# Patient Record
Sex: FEMALE | Race: WHITE | HISPANIC OR LATINO | Employment: FULL TIME | URBAN - METROPOLITAN AREA
[De-identification: names, ages, dates, MRNs, and addresses within clinical notes are randomized per-mention and may not be internally consistent; named-entity substitution may affect disease eponyms.]

---

## 2022-04-14 ENCOUNTER — TELEPHONE (OUTPATIENT)
Dept: SCHEDULING | Facility: IMAGING CENTER | Age: 27
End: 2022-04-14

## 2022-04-25 ENCOUNTER — OFFICE VISIT (OUTPATIENT)
Dept: MEDICAL GROUP | Facility: PHYSICIAN GROUP | Age: 27
End: 2022-04-25
Payer: COMMERCIAL

## 2022-04-25 VITALS
BODY MASS INDEX: 26.79 KG/M2 | TEMPERATURE: 98.1 F | RESPIRATION RATE: 12 BRPM | DIASTOLIC BLOOD PRESSURE: 50 MMHG | WEIGHT: 160.8 LBS | OXYGEN SATURATION: 97 % | SYSTOLIC BLOOD PRESSURE: 106 MMHG | HEART RATE: 86 BPM | HEIGHT: 65 IN

## 2022-04-25 DIAGNOSIS — F41.1 GENERALIZED ANXIETY DISORDER: ICD-10-CM

## 2022-04-25 DIAGNOSIS — Z23 NEED FOR VACCINATION: ICD-10-CM

## 2022-04-25 DIAGNOSIS — Z00.00 WELLNESS EXAMINATION: ICD-10-CM

## 2022-04-25 PROCEDURE — 90651 9VHPV VACCINE 2/3 DOSE IM: CPT

## 2022-04-25 PROCEDURE — 99203 OFFICE O/P NEW LOW 30 MIN: CPT | Mod: 25

## 2022-04-25 PROCEDURE — 90471 IMMUNIZATION ADMIN: CPT

## 2022-04-25 RX ORDER — LEVONORGESTREL AND ETHINYL ESTRADIOL 0.1-0.02MG
1 KIT ORAL DAILY
COMMUNITY
Start: 2021-10-29 | End: 2022-04-25

## 2022-04-25 RX ORDER — TIMOLOL MALEATE 5 MG/ML
1 SOLUTION/ DROPS OPHTHALMIC DAILY
COMMUNITY
Start: 2022-04-11 | End: 2022-06-27 | Stop reason: SDUPTHER

## 2022-04-25 ASSESSMENT — ANXIETY QUESTIONNAIRES
3. WORRYING TOO MUCH ABOUT DIFFERENT THINGS: NEARLY EVERY DAY
GAD7 TOTAL SCORE: 11
IF YOU CHECKED OFF ANY PROBLEMS ON THIS QUESTIONNAIRE, HOW DIFFICULT HAVE THESE PROBLEMS MADE IT FOR YOU TO DO YOUR WORK, TAKE CARE OF THINGS AT HOME, OR GET ALONG WITH OTHER PEOPLE: SOMEWHAT DIFFICULT
1. FEELING NERVOUS, ANXIOUS, OR ON EDGE: MORE THAN HALF THE DAYS
6. BECOMING EASILY ANNOYED OR IRRITABLE: SEVERAL DAYS
5. BEING SO RESTLESS THAT IT IS HARD TO SIT STILL: MORE THAN HALF THE DAYS
7. FEELING AFRAID AS IF SOMETHING AWFUL MIGHT HAPPEN: SEVERAL DAYS
4. TROUBLE RELAXING: SEVERAL DAYS
2. NOT BEING ABLE TO STOP OR CONTROL WORRYING: SEVERAL DAYS

## 2022-04-25 ASSESSMENT — PATIENT HEALTH QUESTIONNAIRE - PHQ9: CLINICAL INTERPRETATION OF PHQ2 SCORE: 0

## 2022-04-25 ASSESSMENT — ENCOUNTER SYMPTOMS: NERVOUS/ANXIOUS: 1

## 2022-04-25 NOTE — ASSESSMENT & PLAN NOTE
- Healthy 26-year-old female no concerns today  - Recommend eating a well-balanced diet rich in fruits and vegetables, lean proteins including fish at least twice a week, limited portions of carbohydrates or highly processed foods/sweets  -Recommend daily exercise of at least 30 minutes of moderate to vigorous activity most days the week  -Recommend self-care including meditation and continuation of fostering high-quality coping mechanisms

## 2022-04-25 NOTE — ASSESSMENT & PLAN NOTE
- Chronic stable condition  - Continue to work on self-care including self-esteem, avoiding triggers, and setting boundaries  - Recommend reordering CBT and medications if needed-please make follow-up if condition worsens

## 2022-04-25 NOTE — PROGRESS NOTES
"Subjective:     CC:  Diagnoses of Generalized anxiety disorder, Need for vaccination, and Wellness examination were pertinent to this visit.    HISTORY OF THE PRESENT ILLNESS: Patient is a 26 y.o. female. This pleasant patient is here today to establish care and discuss the following problems:    Problem   Generalized Anxiety Disorder    Chronic condition for 8 years.    -ULISES-7 = 11  -Ongoing work on self esteem, learning triggers, setting boundaries  -CBT done in college, helpful  -Has taken RX in the past, Vivance low dose. Medication was helpful.     Wellness Examination    -Admits to poor diet at times.  Carb rich.  -Exercise: not on a regular routine, but active -hikes with dog, musical theatre.  -Good coping mechanism, good support system at home.           Health Maintenance: HPV given today, patient will bring in current vaccination record as she believes she had a Tdap recently.    ROS:   Review of Systems   Psychiatric/Behavioral: The patient is nervous/anxious (history of anxiety).    All other systems reviewed and are negative.        Objective:     Exam: /50 (BP Location: Left arm, Patient Position: Sitting, BP Cuff Size: Large adult)   Pulse 86   Temp 36.7 °C (98.1 °F) (Temporal)   Resp 12   Ht 1.641 m (5' 4.6\")   Wt 72.9 kg (160 lb 12.8 oz)   SpO2 97%  Body mass index is 27.09 kg/m².    Physical Exam      Labs: No current labs    Assessment & Plan: Medical Decision Making   26 y.o. female with the following -    Problem List Items Addressed This Visit     Generalized anxiety disorder     - Chronic stable condition  - Continue to work on self-care including self-esteem, avoiding triggers, and setting boundaries  - Recommend reordering CBT and medications if needed-please make follow-up if condition worsens         Wellness examination     - Healthy 26-year-old female no concerns today  - Recommend eating a well-balanced diet rich in fruits and vegetables, lean proteins including fish at " least twice a week, limited portions of carbohydrates or highly processed foods/sweets  -Recommend daily exercise of at least 30 minutes of moderate to vigorous activity most days the week  -Recommend self-care including meditation and continuation of fostering high-quality coping mechanisms           Other Visit Diagnoses     Need for vaccination        Relevant Orders    9VHPV Vaccine 2-3 Dose (GARDASIL 9)          Differential diagnosis, natural history, supportive care, and indications for immediate follow-up discussed.  Shared decision making approach was utilized, and patient is amendable with plan of care.  Patient understands to return to clinic or go to the emergency department if symptoms worsen. All questions and concerns addressed.      Return in about 6 months (around 10/25/2022) for Dose 2 HPV, anxiety.    Please note that this dictation was created using voice recognition software. I have made every reasonable attempt to correct obvious errors, but I expect that there are errors of grammar and possibly content that I did not discover before finalizing the note.

## 2022-05-29 ENCOUNTER — OFFICE VISIT (OUTPATIENT)
Dept: URGENT CARE | Facility: CLINIC | Age: 27
End: 2022-05-29
Payer: COMMERCIAL

## 2022-05-29 VITALS
HEIGHT: 67 IN | RESPIRATION RATE: 20 BRPM | OXYGEN SATURATION: 97 % | DIASTOLIC BLOOD PRESSURE: 68 MMHG | TEMPERATURE: 98.8 F | SYSTOLIC BLOOD PRESSURE: 104 MMHG | HEART RATE: 98 BPM | WEIGHT: 145 LBS | BODY MASS INDEX: 22.76 KG/M2

## 2022-05-29 DIAGNOSIS — J01.90 ACUTE BACTERIAL SINUSITIS: ICD-10-CM

## 2022-05-29 DIAGNOSIS — B96.89 ACUTE BACTERIAL SINUSITIS: ICD-10-CM

## 2022-05-29 PROCEDURE — 99213 OFFICE O/P EST LOW 20 MIN: CPT | Performed by: FAMILY MEDICINE

## 2022-05-29 RX ORDER — AMOXICILLIN AND CLAVULANATE POTASSIUM 875; 125 MG/1; MG/1
1 TABLET, FILM COATED ORAL 2 TIMES DAILY
Qty: 20 TABLET | Refills: 0 | Status: SHIPPED | OUTPATIENT
Start: 2022-05-29 | End: 2022-06-08

## 2022-05-29 ASSESSMENT — ENCOUNTER SYMPTOMS
SINUS PAIN: 1
FEVER: 0

## 2022-05-29 NOTE — PROGRESS NOTES
"Subjective:     Farzana Henson is a 26 y.o. female who presents for Sinus Problem (Sinus pressure,congestion,x1 week)    HPI  Pt presents for evaluation of an acute problem  Patient with an acute illness for the past week  Started with sore throat   Progressed to cough and sinus pain   Having constant pain in the sinus area   Pain is worsening each day   Pain is moderate     Review of Systems   Constitutional: Negative for fever.   HENT: Positive for congestion and sinus pain.    Skin: Negative for rash.       PMH:  has a past medical history of Anxiety.  MEDS:   Current Outpatient Medications:   •  VIENVA 0.1-20 MG-MCG per tablet, Take 1 Tablet by mouth every day., Disp: , Rfl:   ALLERGIES: No Known Allergies  SURGHX:   Past Surgical History:   Procedure Laterality Date   • NASAL FRACTURE REDUCTION CLOSED       SOCHX:  reports that she has never smoked. She has never used smokeless tobacco. She reports current alcohol use. She reports that she does not use drugs.     Objective:   /68 (BP Location: Left arm, Patient Position: Sitting, BP Cuff Size: Adult)   Pulse 98   Temp 37.1 °C (98.8 °F) (Temporal)   Resp 20   Ht 1.702 m (5' 7\")   Wt 65.8 kg (145 lb)   SpO2 97%   BMI 22.71 kg/m²     Physical Exam  Constitutional:       General: She is not in acute distress.     Appearance: She is well-developed. She is not diaphoretic.   HENT:      Head: Normocephalic and atraumatic.      Right Ear: Tympanic membrane, ear canal and external ear normal.      Left Ear: Tympanic membrane, ear canal and external ear normal.      Nose: Congestion and rhinorrhea present.      Mouth/Throat:      Mouth: Mucous membranes are moist.      Pharynx: Oropharynx is clear. No oropharyngeal exudate or posterior oropharyngeal erythema.   Neck:      Trachea: No tracheal deviation.   Cardiovascular:      Rate and Rhythm: Normal rate and regular rhythm.   Pulmonary:      Effort: Pulmonary effort is normal. No respiratory distress. "      Breath sounds: Normal breath sounds. No wheezing or rales.   Musculoskeletal:      Cervical back: Normal range of motion and neck supple. No tenderness.   Lymphadenopathy:      Cervical: No cervical adenopathy.   Skin:     General: Skin is warm and dry.      Findings: No rash.   Neurological:      Mental Status: She is alert.         Assessment/Plan:   Assessment    1. Acute bacterial sinusitis  - amoxicillin-clavulanate (AUGMENTIN) 875-125 MG Tab; Take 1 Tablet by mouth 2 times a day for 10 days.  Dispense: 20 Tablet; Refill: 0    Patient with acute bacterial sinusitis.  Treat with Augmentin and reviewed other supportive care measures.  Follow-up as needed.

## 2022-05-29 NOTE — LETTER
May 29, 2022    To Whom It May Concern:         This is confirmation that Farzana ROLLINS Sixto attended her scheduled appointment with Alphonse Klein M.D. on 5/29/22.  She is recovering from an acute illness. Please excuse her from days missed at work.  She is anticipated to be well enough to return to work by 6/1/22.  Thank you for making accommodations as she recovers.           If you have any questions please do not hesitate to call me at the phone number listed below.    Sincerely,          Alphonse Klein M.D.  [phone]

## 2022-06-14 ENCOUNTER — OFFICE VISIT (OUTPATIENT)
Dept: URGENT CARE | Facility: CLINIC | Age: 27
End: 2022-06-14
Payer: COMMERCIAL

## 2022-06-14 VITALS
OXYGEN SATURATION: 98 % | HEIGHT: 67 IN | HEART RATE: 79 BPM | TEMPERATURE: 98.5 F | DIASTOLIC BLOOD PRESSURE: 70 MMHG | SYSTOLIC BLOOD PRESSURE: 102 MMHG | WEIGHT: 145 LBS | BODY MASS INDEX: 22.76 KG/M2 | RESPIRATION RATE: 17 BRPM

## 2022-06-14 DIAGNOSIS — L30.4 INTERTRIGO: ICD-10-CM

## 2022-06-14 PROCEDURE — 99213 OFFICE O/P EST LOW 20 MIN: CPT | Performed by: STUDENT IN AN ORGANIZED HEALTH CARE EDUCATION/TRAINING PROGRAM

## 2022-06-14 RX ORDER — CLOTRIMAZOLE 1 %
CREAM (GRAM) TOPICAL
Qty: 60 G | Refills: 0 | Status: SHIPPED | OUTPATIENT
Start: 2022-06-14 | End: 2022-10-17

## 2022-06-14 ASSESSMENT — ENCOUNTER SYMPTOMS
CHILLS: 0
GASTROINTESTINAL NEGATIVE: 1
MUSCULOSKELETAL NEGATIVE: 1
HEADACHES: 0
SENSORY CHANGE: 0
CARDIOVASCULAR NEGATIVE: 1
EYES NEGATIVE: 1

## 2022-06-15 NOTE — PROGRESS NOTES
"Subjective:   Farzana Henson is a 26 y.o. female who presents for Buttocks Pain (Cracked skin in between buttocks x 1 week , burning and painful )      HPI  ***        Medications:    • Vienva Tabs    Allergies: Patient has no known allergies.    Problem List: Farzana Henson does not have any pertinent problems on file.    Surgical History:  Past Surgical History:   Procedure Laterality Date   • NASAL FRACTURE REDUCTION CLOSED         Past Social Hx: Farzana Henson  reports that she has never smoked. She has never used smokeless tobacco. She reports current alcohol use. She reports that she does not use drugs.     Past Family Hx:  Farzana Henson family history includes Cancer in her maternal grandmother, paternal aunt, and paternal grandmother; Heart Disease in her father; Lung Disease in her father; No Known Problems in her mother.     Problem list, medications, and allergies reviewed by myself today in Epic.     Objective:     /70 (BP Location: Left arm, Patient Position: Sitting, BP Cuff Size: Adult)   Pulse 79   Temp 36.9 °C (98.5 °F) (Temporal)   Resp 17   Ht 1.702 m (5' 7\")   Wt 65.8 kg (145 lb)   SpO2 98%   BMI 22.71 kg/m²     Physical Exam    Diagnosis and associated orders:     There are no diagnoses linked to this encounter.     Comments/MDM:     • ***       I personally reviewed prior external notes and test results pertinent to today's visit. Supportive care, natural history, differential diagnoses, and indications for immediate follow-up discussed. Patient expresses understanding and agrees to plan. Patient denies any other questions or concerns.     Follow-up with the primary care physician for recheck, reevaluation, and consideration of further management.    Please note that this dictation was created using voice recognition software. I have made a reasonable attempt to correct obvious errors, but I expect that there are errors of grammar and possibly content " that I did not discover before finalizing the note.    This note was electronically signed by Alfred Marley PA-C

## 2022-06-15 NOTE — PROGRESS NOTES
"Subjective     Farzana Henson is a 26 y.o. female who presents with Buttocks Pain (Cracked skin in between buttocks x 1 week , burning and painful )        Patient presents today with lesion between buttocks which began about a week ago.  Patient has discomfort with lesion.  Describes pain as burning.  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 She has been trying to clean the wound and keep dry at home but nothing seems to be helping.    HPI    Review of Systems   Constitutional: Negative for chills and malaise/fatigue.   HENT: Negative.    Eyes: Negative.    Cardiovascular: Negative.    Gastrointestinal: Negative.    Genitourinary: Negative.    Musculoskeletal: Negative.    Skin: Positive for rash. Negative for itching.   Neurological: Negative for sensory change and headaches.              Objective     /70 (BP Location: Left arm, Patient Position: Sitting, BP Cuff Size: Adult)   Pulse 79   Temp 36.9 °C (98.5 °F) (Temporal)   Resp 17   Ht 1.702 m (5' 7\")   Wt 65.8 kg (145 lb)   SpO2 98%   BMI 22.71 kg/m²      Physical Exam  Constitutional:       Appearance: Normal appearance.   Cardiovascular:      Rate and Rhythm: Normal rate and regular rhythm.   Skin:     General: Skin is warm and dry.          Neurological:      General: No focal deficit present.      Mental Status: She is alert and oriented to person, place, and time.   Psychiatric:         Mood and Affect: Mood normal.         Behavior: Behavior normal.                             Assessment & Plan        1. Intertrigo  - clotrimazole (LOTRIMIN) 1 % Cream; Apply to affected area twice per day until resolution.  Dispense: 60 g; Refill: 0            Patient educated on diagnosis and provided with patient education print out and reviewed in the room in order to answer any questions.  Patient prescribe clotrimazole cream to apply twice a day.    Patient educated on supportive measures aimed at " minimizing moisture and friction in the involved area and reducing. Supportive measures include:  ? Daily cleansing of skin with a mild cleanser followed by drying of affected area with a hair dryer on a cool setting  ? Aeration of affected area when feasible.  ? Daily application of drying powders, such as powders composed of microporous cellulose.  ? Zinc oxide, petrolatum, or dimethicone topicals can be applied after cleaning the intertriginous area with mild soap and water and drying with a hair dryer on a cold setting.       Differential diagnoses, supportive care, and indications for immediate follow-up discussed with patient.     Pathogenesis of diagnosis discussed including typical length and natural progression.      Instructed to return to urgent care or follow up with primary care if symptoms fail to improve, for any change in condition, further concerns, or new concerning symptoms.    Patient states understanding and agrees with the plan of care and discharge instructions.

## 2022-06-27 RX ORDER — TIMOLOL MALEATE 5 MG/ML
1 SOLUTION/ DROPS OPHTHALMIC DAILY
Qty: 28 TABLET | Refills: 11 | Status: SHIPPED | OUTPATIENT
Start: 2022-06-27 | End: 2022-10-17

## 2022-06-27 NOTE — TELEPHONE ENCOUNTER
Received request via: Patient    Was the patient seen in the last year in this department? Yes    Does the patient have an active prescription (recently filled or refills available) for medication(s) requested? No         Pt sent [facility] message requesting 3 month supply

## 2022-08-02 ENCOUNTER — TELEPHONE (OUTPATIENT)
Dept: MEDICAL GROUP | Facility: PHYSICIAN GROUP | Age: 27
End: 2022-08-02
Payer: COMMERCIAL

## 2022-08-02 RX ORDER — LEVONORGESTREL AND ETHINYL ESTRADIOL 0.1-0.02MG
1 KIT ORAL DAILY
Qty: 28 TABLET | Refills: 2 | Status: SHIPPED | OUTPATIENT
Start: 2022-08-02 | End: 2022-10-13 | Stop reason: SDUPTHER

## 2022-08-02 NOTE — PROGRESS NOTES
Patient's birth control has been backordered and it is uncertain when it will be back in stock.  She is requesting similar version.  Levonorgestrel-ethinyl steroidal 0.1-20 mg/mcg ordered and sent to pharmacy on file

## 2022-08-02 NOTE — TELEPHONE ENCOUNTER
VOICEMAIL  1. Caller Name: [facility]                      Call Back Number: [phone]    2. Message: Called to report the Vienva BC is out of stock all over, and would like to know if PCP would expedite a new script for either Ariella or Bisseta, (she stated they have a similar action).  She stated she had already informed the Pt and the Pt agreed to having a different medication sent in, she just needs it today as she is out of her BC.  Please advise.

## 2022-08-21 ENCOUNTER — OFFICE VISIT (OUTPATIENT)
Dept: URGENT CARE | Facility: CLINIC | Age: 27
End: 2022-08-21
Payer: COMMERCIAL

## 2022-08-21 VITALS
WEIGHT: 156.2 LBS | HEART RATE: 102 BPM | BODY MASS INDEX: 24.52 KG/M2 | DIASTOLIC BLOOD PRESSURE: 56 MMHG | OXYGEN SATURATION: 96 % | RESPIRATION RATE: 14 BRPM | SYSTOLIC BLOOD PRESSURE: 100 MMHG | HEIGHT: 67 IN | TEMPERATURE: 97.6 F

## 2022-08-21 DIAGNOSIS — U07.1 COVID: ICD-10-CM

## 2022-08-21 DIAGNOSIS — J02.9 SORE THROAT: ICD-10-CM

## 2022-08-21 DIAGNOSIS — J02.0 STREP PHARYNGITIS: ICD-10-CM

## 2022-08-21 DIAGNOSIS — B34.9 VIRAL ILLNESS: ICD-10-CM

## 2022-08-21 LAB
EXTERNAL QUALITY CONTROL: ABNORMAL
INT CON NEG: NEGATIVE
INT CON POS: POSITIVE
S PYO AG THROAT QL: POSITIVE
SARS-COV+SARS-COV-2 AG RESP QL IA.RAPID: POSITIVE

## 2022-08-21 PROCEDURE — 87426 SARSCOV CORONAVIRUS AG IA: CPT

## 2022-08-21 PROCEDURE — 99213 OFFICE O/P EST LOW 20 MIN: CPT | Mod: CS

## 2022-08-21 PROCEDURE — 87880 STREP A ASSAY W/OPTIC: CPT

## 2022-08-21 RX ORDER — BENZONATATE 100 MG/1
100 CAPSULE ORAL 3 TIMES DAILY PRN
Qty: 60 CAPSULE | Refills: 0 | Status: SHIPPED | OUTPATIENT
Start: 2022-08-21 | End: 2022-10-17

## 2022-08-21 RX ORDER — PREDNISONE 10 MG/1
40 TABLET ORAL DAILY
Qty: 20 TABLET | Refills: 0 | Status: SHIPPED | OUTPATIENT
Start: 2022-08-21 | End: 2022-08-26

## 2022-08-21 RX ORDER — ALBUTEROL SULFATE 90 UG/1
2 AEROSOL, METERED RESPIRATORY (INHALATION) EVERY 6 HOURS PRN
Qty: 8.5 G | Refills: 0 | Status: SHIPPED | OUTPATIENT
Start: 2022-08-21 | End: 2022-10-17

## 2022-08-21 RX ORDER — AMOXICILLIN 500 MG/1
500 CAPSULE ORAL 2 TIMES DAILY
Qty: 20 CAPSULE | Refills: 0 | Status: SHIPPED | OUTPATIENT
Start: 2022-08-21 | End: 2022-08-31

## 2022-08-22 NOTE — PROGRESS NOTES
"Subjective:   Farzana Henson is a 26 y.o. female who presents for Sore Throat (X 3 days with cough, congestion.  Denies fever or chills. )      HPI:  Patient presents with 3 days of sore throat, nonproductive cough, nasal congestion, rhinorrhea, patient denies fever, chills, night sweats, nausea, vomiting, diarrhea.  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  Patient is not vaccinated for flu and/or COVID.  Patient has tried Mucinex, TheraFlu, Ricola lozenges with mild improvement in symptoms.  Patient denies ear pain, headache.      ROS per HPI as above    Medications:    Current Outpatient Medications on File Prior to Visit   Medication Sig Dispense Refill    levonorgestrel-ethinyl estradiol (AVIANE) 0.1-20 MG-MCG per tablet Take 1 Tablet by mouth every day. 28 Tablet 2    clotrimazole (LOTRIMIN) 1 % Cream Apply to affected area twice per day until resolution. 60 g 0    VIENVA 0.1-20 MG-MCG per tablet Take 1 Tablet by mouth every day. (Patient not taking: Reported on 8/21/2022) 28 Tablet 11     No current facility-administered medications on file prior to visit.        Allergies:   Patient has no known allergies.    Problem List:   Patient Active Problem List   Diagnosis    Generalized anxiety disorder    Wellness examination        Surgical History:  Past Surgical History:   Procedure Laterality Date    NASAL FRACTURE REDUCTION CLOSED         Past Social Hx:   Social History     Tobacco Use    Smoking status: Never    Smokeless tobacco: Never   Vaping Use    Vaping Use: Never used   Substance Use Topics    Alcohol use: Yes     Comment: socially    Drug use: Never          Problem list, medications, and allergies reviewed by myself today in Epic.     Objective:     /56   Pulse (!) 102   Temp 36.4 °C (97.6 °F) (Temporal)   Resp 14   Ht 1.702 m (5' 7\")   Wt 70.9 kg (156 lb 3.2 oz)   LMP 08/01/2022   SpO2 96%   BMI 24.46 kg/m²     Physical Exam  Vitals " reviewed.   Constitutional:       Appearance: Normal appearance.   HENT:      Head: Normocephalic and atraumatic.      Right Ear: Tympanic membrane and ear canal normal.      Left Ear: Tympanic membrane and ear canal normal.      Nose: Congestion and rhinorrhea present. Rhinorrhea is clear.      Right Sinus: No maxillary sinus tenderness or frontal sinus tenderness.      Left Sinus: No maxillary sinus tenderness or frontal sinus tenderness.      Mouth/Throat:      Mouth: Mucous membranes are moist.      Pharynx: Oropharynx is clear. Uvula midline. Posterior oropharyngeal erythema present. No pharyngeal swelling or oropharyngeal exudate.      Tonsils: No tonsillar exudate.   Eyes:      Conjunctiva/sclera: Conjunctivae normal.      Pupils: Pupils are equal, round, and reactive to light.   Cardiovascular:      Rate and Rhythm: Normal rate and regular rhythm.      Heart sounds: Normal heart sounds.   Pulmonary:      Effort: Pulmonary effort is normal. No respiratory distress.      Breath sounds: No stridor. Examination of the right-upper field reveals decreased breath sounds. Examination of the right-middle field reveals decreased breath sounds. Examination of the right-lower field reveals decreased breath sounds. Decreased breath sounds present. No wheezing, rhonchi or rales.   Chest:      Chest wall: No tenderness.   Abdominal:      General: Abdomen is flat. Bowel sounds are normal.      Palpations: Abdomen is soft.      Tenderness: no abdominal tenderness      Hernia: No hernia is present.   Skin:     General: Skin is warm and dry.      Capillary Refill: Capillary refill takes less than 2 seconds.      Findings: No rash.   Neurological:      Mental Status: She is alert and oriented to person, place, and time.       Assessment/Plan:     Diagnosis and associated orders:   1. Strep pharyngitis  - amoxicillin (AMOXIL) 500 MG Cap; Take 1 Capsule by mouth 2 times a day for 10 days.  Dispense: 20 Capsule; Refill: 0    2.  COVID    3. Viral illness  - albuterol 108 (90 Base) MCG/ACT Aero Soln inhalation aerosol; Inhale 2 Puffs every 6 hours as needed for Shortness of Breath.  Dispense: 8.5 g; Refill: 0  - predniSONE (DELTASONE) 10 MG Tab; Take 4 Tablets by mouth every day for 5 days.  Dispense: 20 Tablet; Refill: 0  - benzonatate (TESSALON) 100 MG Cap; Take 1 Capsule by mouth 3 times a day as needed for Cough.  Dispense: 60 Capsule; Refill: 0  - POCT SARS-COV Antigen TANESHA (Symptomatic only)    4. Sore throat  - POCT Rapid Strep A   No results found for this or any previous visit.   Comments/MDM:     Patient is a pleasant 20 extra female who presents with complaints of sore throat without the presence of fever and chills and night sweats.  Rapid strep and COVID testing performed in urgent care today positive for both.  Will start patient on amoxicillin twice daily for 10 days.   -Supportive care encouraged: rest, fluids, lozenges with benzocaine, tylenol/ibu for discomfort, ice pop/chips, social isolation per CDC guidelines  -Follow up with PCP   -Return in  for reevaluation if symptoms worsen or do not improve.         Pt is clinically stable at today's acute urgent care visit.  No acute distress noted. Appropriate for outpatient management at this time.       Discussed DDx, management options (risks,benefits, and alternatives to planned treatment), natural progression and supportive care.  Expressed understanding and the treatment plan was agreed upon. Questions were encouraged and answered   Return to urgent care prn if new or worsening sx or if there is no improvement in condition prn.    Educated in Red flags and indications to immediately call 911 or present to the Emergency Department.   Advised the patient to follow-up with the primary care physician for recheck, reevaluation, and consideration of further management.      Please note that this dictation was created using voice recognition software. I have made a reasonable  attempt to correct obvious errors, but I expect that there are errors of grammar and possibly content that I did not discover before finalizing the note.    This note was electronically signed by January Warner DNP

## 2022-10-13 RX ORDER — LEVONORGESTREL AND ETHINYL ESTRADIOL 0.1-0.02MG
KIT ORAL
Qty: 84 TABLET | Refills: 2 | Status: SHIPPED | OUTPATIENT
Start: 2022-10-13 | End: 2023-05-31 | Stop reason: SDUPTHER

## 2022-10-17 ENCOUNTER — OFFICE VISIT (OUTPATIENT)
Dept: URGENT CARE | Facility: CLINIC | Age: 27
End: 2022-10-17
Payer: COMMERCIAL

## 2022-10-17 VITALS
BODY MASS INDEX: 25.24 KG/M2 | HEIGHT: 67 IN | TEMPERATURE: 98.1 F | WEIGHT: 160.8 LBS | DIASTOLIC BLOOD PRESSURE: 74 MMHG | HEART RATE: 78 BPM | OXYGEN SATURATION: 97 % | SYSTOLIC BLOOD PRESSURE: 114 MMHG | RESPIRATION RATE: 16 BRPM

## 2022-10-17 DIAGNOSIS — J01.90 ACUTE RHINOSINUSITIS: ICD-10-CM

## 2022-10-17 DIAGNOSIS — J02.0 STREPTOCOCCAL PHARYNGITIS: ICD-10-CM

## 2022-10-17 LAB
INT CON NEG: NORMAL
INT CON POS: NORMAL
S PYO AG THROAT QL: POSITIVE

## 2022-10-17 PROCEDURE — 99213 OFFICE O/P EST LOW 20 MIN: CPT | Performed by: PHYSICIAN ASSISTANT

## 2022-10-17 PROCEDURE — 87880 STREP A ASSAY W/OPTIC: CPT | Performed by: PHYSICIAN ASSISTANT

## 2022-10-17 NOTE — PROGRESS NOTES
"Subjective:   Farzana Henson is a 26 y.o. female who presents for Sinus Problem (Started last night , runny nose , sore throat , trouble swallowing, head fullness. Negative covid test this morning )      HPI  The patient presents to the Urgent Care with sinus symptoms onset last night.  She reports of a runny nose, nasal congestion, sinus pain, and sore throat.  She is handling oral secretions and tolerating fluids.  She had a negative COVID test today.  She reports of painful swallowing. Denies any fever, ear pain, chills, cough, chest pain, SOB, vomiting, diarrhea.       Medications:    albuterol Aers  Aviane Tabs  benzonatate Caps  clotrimazole Crea  Vienva Tabs    Allergies: Patient has no known allergies.    Problem List: Farzana Henson does not have any pertinent problems on file.    Surgical History:  Past Surgical History:   Procedure Laterality Date    NASAL FRACTURE REDUCTION CLOSED         Past Social Hx: Farzana Henson  reports that she has never smoked. She has never used smokeless tobacco. She reports current alcohol use. She reports that she does not use drugs.     Past Family Hx:  Farzana Henson family history includes Cancer in her maternal grandmother, paternal aunt, and paternal grandmother; Heart Disease in her father; Lung Disease in her father; No Known Problems in her mother.     Problem list, medications, and allergies reviewed by myself today in Epic.     Objective:     /74 (BP Location: Left arm, Patient Position: Sitting, BP Cuff Size: Adult)   Pulse 78   Temp 36.7 °C (98.1 °F) (Temporal)   Resp 16   Ht 1.702 m (5' 7\")   Wt 72.9 kg (160 lb 12.8 oz)   SpO2 97%   BMI 25.18 kg/m²     Physical Exam  Vitals reviewed.   Constitutional:       General: She is not in acute distress.     Appearance: Normal appearance. She is not ill-appearing or toxic-appearing.   HENT:      Right Ear: Tympanic membrane, ear canal and external ear normal.      Left Ear: " Tympanic membrane, ear canal and external ear normal.      Mouth/Throat:      Pharynx: Uvula midline. Pharyngeal swelling and posterior oropharyngeal erythema present. No oropharyngeal exudate or uvula swelling.      Tonsils: No tonsillar exudate or tonsillar abscesses.   Eyes:      Conjunctiva/sclera: Conjunctivae normal.      Pupils: Pupils are equal, round, and reactive to light.   Cardiovascular:      Rate and Rhythm: Normal rate and regular rhythm.      Heart sounds: Normal heart sounds.   Pulmonary:      Effort: Pulmonary effort is normal.      Breath sounds: Normal breath sounds.   Musculoskeletal:      Cervical back: Neck supple. No rigidity.   Lymphadenopathy:      Cervical: No cervical adenopathy.   Skin:     General: Skin is warm and dry.   Neurological:      General: No focal deficit present.      Mental Status: She is alert and oriented to person, place, and time.   Psychiatric:         Mood and Affect: Mood normal.         Behavior: Behavior normal.     Strep A: Positive.     Diagnosis and associated orders:     1. Streptococcal pharyngitis  - POCT Rapid Strep A  - amoxicillin (AMOXIL) 500 MG Cap; Take 1 Capsule by mouth 2 times a day for 10 days.  Dispense: 20 Capsule; Refill: 0    2. Acute rhinosinusitis     Comments/MDM:      Discussed with patient at length I am not suspicious for bacterial sinusitis at this time. However, she was positive for Strep today. We will start amoxicillin. Take for the full 10 days. Recommend symptomatic and supportive care at this time which includes increase fluid intake, warm salt water gargles, nasal saline washes, Flonase nasal spray, over-the-counter decongestants.  Tylenol and ibuprofen for any discomfort.  If no improvement in 8 to 10 days or any worsening, recommend returning to the urgent care.       I personally reviewed prior external notes and test results pertinent to today's visit. Pathogenesis of diagnosis discussed including typical length and natural  progression. Supportive care, natural history, differential diagnoses, and indications for immediate follow-up discussed. Patient expresses understanding and agrees to plan. Patient denies any other questions or concerns.     Follow-up with the primary care physician for recheck, reevaluation, and consideration of further management.    Please note that this dictation was created using voice recognition software. I have made a reasonable attempt to correct obvious errors, but I expect that there are errors of grammar and possibly content that I did not discover before finalizing the note.    This note was electronically signed by Alfred Marley PA-C

## 2022-10-17 NOTE — LETTER
October 17, 2022         Patient: Farzana Henson   YOB: 1995   Date of Visit: 10/17/2022           To Whom it May Concern:    Farzana Henson was seen in my clinic on 10/17/2022. Please excuse from work today and tomorrow. May return on 10/19.     If you have any questions or concerns, please don't hesitate to call.        Sincerely,           Alfred Marley P.A.-C.  Electronically Signed

## 2022-10-18 ENCOUNTER — TELEPHONE (OUTPATIENT)
Dept: URGENT CARE | Facility: CLINIC | Age: 27
End: 2022-10-18

## 2022-10-18 RX ORDER — AMOXICILLIN 500 MG/1
500 CAPSULE ORAL 2 TIMES DAILY
Qty: 20 CAPSULE | Refills: 0 | Status: SHIPPED | OUTPATIENT
Start: 2022-10-18 | End: 2022-10-28

## 2022-10-18 NOTE — TELEPHONE ENCOUNTER
Hello , patient called today none of her medication was sent to [facility] on [address].   Please advise.   Thank you.

## 2022-11-04 ENCOUNTER — APPOINTMENT (OUTPATIENT)
Dept: MEDICAL GROUP | Facility: PHYSICIAN GROUP | Age: 27
End: 2022-11-04
Payer: COMMERCIAL

## 2022-11-30 SDOH — ECONOMIC STABILITY: HOUSING INSECURITY
IN THE LAST 12 MONTHS, WAS THERE A TIME WHEN YOU DID NOT HAVE A STEADY PLACE TO SLEEP OR SLEPT IN A SHELTER (INCLUDING NOW)?: NO

## 2022-11-30 SDOH — HEALTH STABILITY: PHYSICAL HEALTH: ON AVERAGE, HOW MANY MINUTES DO YOU ENGAGE IN EXERCISE AT THIS LEVEL?: 30 MIN

## 2022-11-30 SDOH — ECONOMIC STABILITY: FOOD INSECURITY: WITHIN THE PAST 12 MONTHS, YOU WORRIED THAT YOUR FOOD WOULD RUN OUT BEFORE YOU GOT MONEY TO BUY MORE.: NEVER TRUE

## 2022-11-30 SDOH — ECONOMIC STABILITY: INCOME INSECURITY: HOW HARD IS IT FOR YOU TO PAY FOR THE VERY BASICS LIKE FOOD, HOUSING, MEDICAL CARE, AND HEATING?: NOT HARD AT ALL

## 2022-11-30 SDOH — ECONOMIC STABILITY: TRANSPORTATION INSECURITY
IN THE PAST 12 MONTHS, HAS LACK OF RELIABLE TRANSPORTATION KEPT YOU FROM MEDICAL APPOINTMENTS, MEETINGS, WORK OR FROM GETTING THINGS NEEDED FOR DAILY LIVING?: NO

## 2022-11-30 SDOH — HEALTH STABILITY: PHYSICAL HEALTH: ON AVERAGE, HOW MANY DAYS PER WEEK DO YOU ENGAGE IN MODERATE TO STRENUOUS EXERCISE (LIKE A BRISK WALK)?: 3 DAYS

## 2022-11-30 SDOH — ECONOMIC STABILITY: FOOD INSECURITY: WITHIN THE PAST 12 MONTHS, THE FOOD YOU BOUGHT JUST DIDN'T LAST AND YOU DIDN'T HAVE MONEY TO GET MORE.: NEVER TRUE

## 2022-11-30 SDOH — ECONOMIC STABILITY: TRANSPORTATION INSECURITY
IN THE PAST 12 MONTHS, HAS THE LACK OF TRANSPORTATION KEPT YOU FROM MEDICAL APPOINTMENTS OR FROM GETTING MEDICATIONS?: NO

## 2022-11-30 SDOH — ECONOMIC STABILITY: HOUSING INSECURITY: IN THE LAST 12 MONTHS, HOW MANY PLACES HAVE YOU LIVED?: 1

## 2022-11-30 SDOH — ECONOMIC STABILITY: TRANSPORTATION INSECURITY
IN THE PAST 12 MONTHS, HAS LACK OF TRANSPORTATION KEPT YOU FROM MEETINGS, WORK, OR FROM GETTING THINGS NEEDED FOR DAILY LIVING?: NO

## 2022-11-30 SDOH — ECONOMIC STABILITY: INCOME INSECURITY: IN THE LAST 12 MONTHS, WAS THERE A TIME WHEN YOU WERE NOT ABLE TO PAY THE MORTGAGE OR RENT ON TIME?: NO

## 2022-11-30 SDOH — HEALTH STABILITY: MENTAL HEALTH
STRESS IS WHEN SOMEONE FEELS TENSE, NERVOUS, ANXIOUS, OR CAN'T SLEEP AT NIGHT BECAUSE THEIR MIND IS TROUBLED. HOW STRESSED ARE YOU?: RATHER MUCH

## 2022-11-30 ASSESSMENT — SOCIAL DETERMINANTS OF HEALTH (SDOH)
HOW HARD IS IT FOR YOU TO PAY FOR THE VERY BASICS LIKE FOOD, HOUSING, MEDICAL CARE, AND HEATING?: NOT HARD AT ALL
HOW OFTEN DO YOU ATTEND CHURCH OR RELIGIOUS SERVICES?: NEVER
HOW MANY DRINKS CONTAINING ALCOHOL DO YOU HAVE ON A TYPICAL DAY WHEN YOU ARE DRINKING: 1 OR 2
HOW OFTEN DO YOU HAVE SIX OR MORE DRINKS ON ONE OCCASION: NEVER
HOW OFTEN DO YOU GET TOGETHER WITH FRIENDS OR RELATIVES?: ONCE A WEEK
HOW OFTEN DO YOU GET TOGETHER WITH FRIENDS OR RELATIVES?: ONCE A WEEK
DO YOU BELONG TO ANY CLUBS OR ORGANIZATIONS SUCH AS CHURCH GROUPS UNIONS, FRATERNAL OR ATHLETIC GROUPS, OR SCHOOL GROUPS?: NO
WITHIN THE PAST 12 MONTHS, YOU WORRIED THAT YOUR FOOD WOULD RUN OUT BEFORE YOU GOT THE MONEY TO BUY MORE: NEVER TRUE
HOW OFTEN DO YOU HAVE A DRINK CONTAINING ALCOHOL: MONTHLY OR LESS
HOW OFTEN DO YOU ATTENT MEETINGS OF THE CLUB OR ORGANIZATION YOU BELONG TO?: PATIENT DECLINED
IN A TYPICAL WEEK, HOW MANY TIMES DO YOU TALK ON THE PHONE WITH FAMILY, FRIENDS, OR NEIGHBORS?: ONCE A WEEK
HOW OFTEN DO YOU ATTEND CHURCH OR RELIGIOUS SERVICES?: NEVER
DO YOU BELONG TO ANY CLUBS OR ORGANIZATIONS SUCH AS CHURCH GROUPS UNIONS, FRATERNAL OR ATHLETIC GROUPS, OR SCHOOL GROUPS?: NO
IN A TYPICAL WEEK, HOW MANY TIMES DO YOU TALK ON THE PHONE WITH FAMILY, FRIENDS, OR NEIGHBORS?: ONCE A WEEK
HOW OFTEN DO YOU ATTENT MEETINGS OF THE CLUB OR ORGANIZATION YOU BELONG TO?: PATIENT DECLINED

## 2022-11-30 ASSESSMENT — LIFESTYLE VARIABLES
HOW OFTEN DO YOU HAVE SIX OR MORE DRINKS ON ONE OCCASION: NEVER
SKIP TO QUESTIONS 9-10: 1
HOW OFTEN DO YOU HAVE A DRINK CONTAINING ALCOHOL: MONTHLY OR LESS
AUDIT-C TOTAL SCORE: 1
HOW MANY STANDARD DRINKS CONTAINING ALCOHOL DO YOU HAVE ON A TYPICAL DAY: 1 OR 2

## 2022-12-01 ENCOUNTER — OFFICE VISIT (OUTPATIENT)
Dept: MEDICAL GROUP | Facility: PHYSICIAN GROUP | Age: 27
End: 2022-12-01
Payer: COMMERCIAL

## 2022-12-01 VITALS
OXYGEN SATURATION: 94 % | BODY MASS INDEX: 24.8 KG/M2 | DIASTOLIC BLOOD PRESSURE: 60 MMHG | HEART RATE: 96 BPM | WEIGHT: 158 LBS | SYSTOLIC BLOOD PRESSURE: 106 MMHG | HEIGHT: 67 IN | TEMPERATURE: 97.9 F

## 2022-12-01 DIAGNOSIS — F41.9 ANXIETY: ICD-10-CM

## 2022-12-01 DIAGNOSIS — F41.1 GENERALIZED ANXIETY DISORDER: ICD-10-CM

## 2022-12-01 DIAGNOSIS — Z23 NEED FOR VACCINATION: ICD-10-CM

## 2022-12-01 DIAGNOSIS — J06.9 VIRAL UPPER RESPIRATORY TRACT INFECTION: ICD-10-CM

## 2022-12-01 PROBLEM — F98.8 ADD (ATTENTION DEFICIT DISORDER): Status: ACTIVE | Noted: 2022-12-01

## 2022-12-01 LAB
EXTERNAL QUALITY CONTROL: NORMAL
INT CON NEG: NEGATIVE
INT CON NEG: NEGATIVE
INT CON POS: POSITIVE
INT CON POS: POSITIVE
S PYO AG THROAT QL: NEGATIVE
SARS-COV+SARS-COV-2 AG RESP QL IA.RAPID: NEGATIVE

## 2022-12-01 PROCEDURE — 87426 SARSCOV CORONAVIRUS AG IA: CPT

## 2022-12-01 PROCEDURE — 87880 STREP A ASSAY W/OPTIC: CPT

## 2022-12-01 PROCEDURE — 90471 IMMUNIZATION ADMIN: CPT

## 2022-12-01 PROCEDURE — 99213 OFFICE O/P EST LOW 20 MIN: CPT | Mod: 25

## 2022-12-01 PROCEDURE — 90651 9VHPV VACCINE 2/3 DOSE IM: CPT

## 2022-12-01 NOTE — PROGRESS NOTES
"Chief Complaint   Patient presents with    Cough     Sore throat, congestion, runny nose, x5 days, tested negative for Covid last night    Referral Needed     Therapy/ Anxiety        HPI: Patient is a 26 y.o. female complaining of 4 days of illness including: chills, nonproductive cough, facial pain, nasal congestion, sore throat.   Mucus is: thin.  Similarly ill exposures: yes.  Treatments tried: Theraflu  She  reports that she has never smoked. She has never used smokeless tobacco..     Problem   Add (Attention Deficit Disorder)   Generalized Anxiety Disorder    Chronic condition for 8 years.    -ULISES-7 = 11  -Ongoing work on self esteem, learning triggers, setting boundaries  -CBT done in college, helpful  -Has taken RX in the past, Vivance low dose. Medication was helpful.  -Would like referral behavioral health         ROS:  No fever,  nausea, changes in bowel movements or skin rash.      I reviewed the patient's medications, allergies and medical history:  Current Outpatient Medications   Medication Sig Dispense Refill    AVIANE 0.1-20 MG-MCG per tablet TAKE ONE TABLET BY MOUTH ONE TIME DAILY 84 Tablet 2     No current facility-administered medications for this visit.     Patient has no known allergies.  Past Medical History:   Diagnosis Date    Anxiety         EXAM:  /60 (BP Location: Left arm, Patient Position: Sitting, BP Cuff Size: Adult)   Pulse 96   Temp 36.6 °C (97.9 °F) (Temporal)   Ht 1.702 m (5' 7\")   Wt 71.7 kg (158 lb)   SpO2 94%   General: Alert, no conversational dyspnea or audible wheeze, non-toxic appearance.  Eyes: PERRL, conjunctiva slightly injected, no eye discharge.  Ears: Normal pinnae,TM's bulging bilaterally.  Nares: Patent with thin mucus.  Sinuses: tender over maxillary / frontal sinuses.  Throat: Erythematous injection without exudate.   Neck: Supple, with no adenopathy.  Lungs: Clear to auscultation bilaterally, no wheeze, crackles or rhonchi.   Heart: Regular rate without " murmur.  Skin: Warm and dry without rash.     ASSESSMENT:   1. Viral upper respiratory tract infection    2. Need for vaccination    3. Anxiety    4. Generalized anxiety disorder          Problem List Items Addressed This Visit       Generalized anxiety disorder     - Chronic stable condition  - Continue to work on self-care including self-esteem, avoiding triggers, and setting boundaries  - Referral to          Relevant Orders    Referral to Behavioral Health     Other Visit Diagnoses       Viral upper respiratory tract infection        Need for vaccination        Anxiety                PLAN:  1. Educated patient that majority of upper respiratory infections are viral and do not need antibiotics.   2. Twice daily use of nasal saline rinse or Neti-Pot.  3. OTC anti-pyretics and decongestants as needed.  4. Follow-up in office or urgent care for worsening symptoms, difficulty breathing, lack of expected recovery, or should new symptoms or problems arise.

## 2022-12-01 NOTE — ASSESSMENT & PLAN NOTE
- Chronic stable condition  - Continue to work on self-care including self-esteem, avoiding triggers, and setting boundaries  - Referral to

## 2023-05-31 ENCOUNTER — OFFICE VISIT (OUTPATIENT)
Dept: MEDICAL GROUP | Facility: PHYSICIAN GROUP | Age: 28
End: 2023-05-31
Payer: COMMERCIAL

## 2023-05-31 VITALS
OXYGEN SATURATION: 93 % | TEMPERATURE: 97.7 F | DIASTOLIC BLOOD PRESSURE: 60 MMHG | SYSTOLIC BLOOD PRESSURE: 90 MMHG | HEIGHT: 67 IN | BODY MASS INDEX: 24.74 KG/M2 | WEIGHT: 157.6 LBS | HEART RATE: 80 BPM

## 2023-05-31 DIAGNOSIS — Z30.41 ENCOUNTER FOR SURVEILLANCE OF CONTRACEPTIVE PILLS: ICD-10-CM

## 2023-05-31 DIAGNOSIS — K13.0 CHAPPED LIPS: ICD-10-CM

## 2023-05-31 DIAGNOSIS — Z23 NEED FOR VACCINATION: ICD-10-CM

## 2023-05-31 PROCEDURE — 90471 IMMUNIZATION ADMIN: CPT

## 2023-05-31 PROCEDURE — 90715 TDAP VACCINE 7 YRS/> IM: CPT

## 2023-05-31 PROCEDURE — 90472 IMMUNIZATION ADMIN EACH ADD: CPT

## 2023-05-31 PROCEDURE — 99213 OFFICE O/P EST LOW 20 MIN: CPT | Mod: 25

## 2023-05-31 PROCEDURE — 3078F DIAST BP <80 MM HG: CPT

## 2023-05-31 PROCEDURE — 90651 9VHPV VACCINE 2/3 DOSE IM: CPT

## 2023-05-31 PROCEDURE — 3074F SYST BP LT 130 MM HG: CPT

## 2023-05-31 RX ORDER — LEVONORGESTREL AND ETHINYL ESTRADIOL 0.1-0.02MG
1 KIT ORAL DAILY
Qty: 84 TABLET | Refills: 3 | Status: SHIPPED | OUTPATIENT
Start: 2023-05-31

## 2023-05-31 ASSESSMENT — PATIENT HEALTH QUESTIONNAIRE - PHQ9: CLINICAL INTERPRETATION OF PHQ2 SCORE: 0

## 2023-05-31 ASSESSMENT — ENCOUNTER SYMPTOMS
DIARRHEA: 0
WEIGHT LOSS: 0
HEADACHES: 0
NAUSEA: 0
SHORTNESS OF BREATH: 0
BLURRED VISION: 0
MYALGIAS: 0
COUGH: 0
DIZZINESS: 0
CONSTIPATION: 0
CHILLS: 0
FEVER: 0
ABDOMINAL PAIN: 0
VOMITING: 0
WEAKNESS: 0

## 2023-05-31 NOTE — PROGRESS NOTES
"Subjective:     CC: chapped lips    HPI:   Farzana presents today with    Problem   Chapped Lips    This began over the winter, using vasoline lip balm.  Mostly affecting corners.  Has to apply lip balm every hour to keep moisturized.  Denies any similar occurrences or skin concerns. Denies use of new products/cosmetics, no new pets, no recent travel.  -Character: mild irritation, with pain from cracking.   -Drinks apx. 48 oz. Of water per day.            Health Maintenance: Completed    ROS:  Review of Systems   Constitutional:  Negative for chills, fever, malaise/fatigue and weight loss.   Eyes:  Negative for blurred vision.   Respiratory:  Negative for cough and shortness of breath.    Cardiovascular:  Negative for chest pain.   Gastrointestinal:  Negative for abdominal pain, constipation, diarrhea, nausea and vomiting.   Musculoskeletal:  Negative for myalgias.   Neurological:  Negative for dizziness, weakness and headaches.       Objective:     Exam:  BP 90/60 (BP Location: Left arm, Patient Position: Sitting, BP Cuff Size: Adult)   Pulse 80   Temp 36.5 °C (97.7 °F) (Temporal)   Ht 1.702 m (5' 7\")   Wt 71.5 kg (157 lb 9.6 oz)   SpO2 93%   BMI 24.68 kg/m²  Body mass index is 24.68 kg/m².    Physical Exam  Constitutional:       General: She is not in acute distress.     Appearance: Normal appearance. She is not ill-appearing or toxic-appearing.   HENT:      Head: Normocephalic.      Comments: Mildly chapped lower lip/corners without evidence of fissure  Eyes:      Conjunctiva/sclera: Conjunctivae normal.   Pulmonary:      Effort: Pulmonary effort is normal.   Skin:     General: Skin is warm and dry.   Neurological:      General: No focal deficit present.      Mental Status: She is alert and oriented to person, place, and time.   Psychiatric:         Mood and Affect: Mood normal.         Behavior: Behavior normal.         Labs: No recent labs      Assessment & Plan: Medical Decision Making     27 y.o. female " with the following -     1. Chapped lips  -Self-limited condition uncomplicated-  -continue use of Vaseline lip ointment lips as needed- if condition worsens or fails to improve will refer to dermatology    - hydrocortisone 2.5 % Ointment; Apply 1 Application topically 2 times a day.  Dispense: 20 g; Refill: 0    2. Need for vaccination    - Tdap Vaccine =>6YO IM  - 9VHPV Vaccine 2-3 Dose (GARDASIL 9)    3. Encounter for surveillance of contraceptive pills  Family-planning-continue oral contraceptive use and reorder patient's birth control pills.  - levonorgestrel-ethinyl estradiol (AVIANE) 0.1-20 MG-MCG per tablet; Take 1 Tablet by mouth every day.  Dispense: 84 Tablet; Refill: 3      Differential diagnosis, natural history, supportive care, and indications for immediate follow-up discussed.  Shared decision making approach utilized, and patient is amendable with plan of care.  Patient understands to return to clinic or go to the emergency department if symptoms worsen. All questions and concerns addressed to the best of my knowledge.    Return if symptoms worsen or fail to improve.    Please note that this dictation was created using voice recognition software. I have made every reasonable attempt to correct obvious errors, but I expect that there are errors of grammar and possibly content that I did not discover before finalizing the note.

## 2023-11-21 ENCOUNTER — OFFICE VISIT (OUTPATIENT)
Dept: URGENT CARE | Facility: CLINIC | Age: 28
End: 2023-11-21
Payer: COMMERCIAL

## 2023-11-21 VITALS
HEART RATE: 86 BPM | OXYGEN SATURATION: 98 % | RESPIRATION RATE: 14 BRPM | TEMPERATURE: 97.1 F | WEIGHT: 155 LBS | BODY MASS INDEX: 24.33 KG/M2 | SYSTOLIC BLOOD PRESSURE: 104 MMHG | HEIGHT: 67 IN | DIASTOLIC BLOOD PRESSURE: 68 MMHG

## 2023-11-21 DIAGNOSIS — J02.9 SORE THROAT: ICD-10-CM

## 2023-11-21 LAB — S PYO DNA SPEC NAA+PROBE: NOT DETECTED

## 2023-11-21 PROCEDURE — 87651 STREP A DNA AMP PROBE: CPT

## 2023-11-21 PROCEDURE — 3074F SYST BP LT 130 MM HG: CPT

## 2023-11-21 PROCEDURE — 99213 OFFICE O/P EST LOW 20 MIN: CPT

## 2023-11-21 PROCEDURE — 3078F DIAST BP <80 MM HG: CPT

## 2023-11-21 RX ORDER — DEXAMETHASONE SODIUM PHOSPHATE 10 MG/ML
10 INJECTION INTRAMUSCULAR; INTRAVENOUS ONCE
Status: COMPLETED | OUTPATIENT
Start: 2023-11-21 | End: 2023-11-21

## 2023-11-21 RX ADMIN — DEXAMETHASONE SODIUM PHOSPHATE 10 MG: 10 INJECTION INTRAMUSCULAR; INTRAVENOUS at 13:38

## 2023-11-21 NOTE — PROGRESS NOTES
Chief Complaint   Patient presents with    Pharyngitis     X 2 days, sore throat       HISTORY OF PRESENT ILLNESS: Patient is a pleasant 27 y.o. female who presents to urgent care today ongoing sore throat for the last 2 days.  Denies any fevers, no noted ear pain.  She has been taking OTC medications with little to no relief.  Denies any history related otherwise.    Patient Active Problem List    Diagnosis Date Noted    Chapped lips 05/31/2023    ADD (attention deficit disorder) 12/01/2022    Generalized anxiety disorder 04/25/2022       Allergies:Patient has no known allergies.    Current Outpatient Medications Ordered in Epic   Medication Sig Dispense Refill    levonorgestrel-ethinyl estradiol (AVIANE) 0.1-20 MG-MCG per tablet Take 1 Tablet by mouth every day. 84 Tablet 3    hydrocortisone 2.5 % Ointment Apply 1 Application topically 2 times a day. (Patient not taking: Reported on 11/21/2023) 20 g 0     Current Facility-Administered Medications Ordered in Epic   Medication Dose Route Frequency Provider Last Rate Last Admin    dexamethasone (Decadron) injection (check route below) 10 mg  10 mg Oral Once GRACE Szymanski           Past Medical History:   Diagnosis Date    Anxiety        Social History     Tobacco Use    Smoking status: Never    Smokeless tobacco: Never   Vaping Use    Vaping Use: Never used   Substance Use Topics    Alcohol use: Yes     Comment: socially    Drug use: Never       Family Status   Relation Name Status    Mo  Alive    Fa  Alive        Post covid long haul syndrome    PAunt  Alive    MGMo  Alive    PGMo  Alive     Family History   Problem Relation Age of Onset    No Known Problems Mother     Heart Disease Father     Lung Disease Father     Cancer Paternal Aunt     Cancer Maternal Grandmother     Cancer Paternal Grandmother        ROS:  Review of Systems   Constitutional: Negative for fever, chills, weight loss, malaise, and fatigue.   HENT: Negative for ear pain, nosebleeds,  "congestion, positive sore throat and negative neck pain.    Eyes: Negative for vision changes.   Neuro: Negative for headache, sensory changes, weakness, seizure, LOC.   Cardiovascular: Negative for chest pain, palpitations, orthopnea and leg swelling.   Respiratory: Negative for cough, sputum production, shortness of breath and wheezing.   Gastrointestinal: Negative for abdominal pain, nausea, vomiting or diarrhea.   Musculoskeletal: Negative for falls, neck pain, back pain, joint pain, myalgias.   Skin: Negative for rash, diaphoresis.     Exam:  /68   Pulse 86   Temp 36.2 °C (97.1 °F) (Temporal)   Resp 14   Ht 1.702 m (5' 7\")   Wt 70.3 kg (155 lb)   SpO2 98%   General: well-nourished, well-developed female in NAD  Head: normocephalic, atraumatic  Eyes: PERRLA, no conjunctival injection, acuity grossly intact, lids normal.  Ears: normal shape and symmetry, no tenderness, no discharge. External canals are without any significant edema or erythema. Tympanic membranes are without any inflammation, no effusion. Gross auditory acuity is intact.  Nose: symmetrical without tenderness, no discharge.  Mouth/Throat: reasonable hygiene, positive erythema, exudates and 2+ tonsillar enlargement.  Neck: no masses, range of motion within normal limits, no tracheal deviation. No obvious thyroid enlargement.   Lymph: no cervical adenopathy. No supraclavicular adenopathy.   Neuro: alert and oriented. No sensory deficit.   Cardiovascular: regular rate and rhythm. No edema.  Pulmonary: no distress. Chest is symmetrical with respiration, no wheezes, crackles, or rhonchi.   Musculoskeletal: no clubbing, appropriate muscle tone, gait is stable.  Skin: warm, dry, intact, no clubbing, no cyanosis, no rashes.   Psych: appropriate mood, affect, judgement.         Assessment/Plan:  1. Sore throat  POCT GROUP A STREP, PCR    dexamethasone (Decadron) injection (check route below) 10 mg      Patient comes in with complaints of a sore " throat for the last 2 days.  Taking OTC medications with little to no relief.  On physical exam it is noted patient has erythremia with exudate and 2+ tonsillar enlargement.  POCT strep complete to rule out potential causes.  Patient given Decadron today here in the office for comfort measures.  Advised the use of Motrin and Tylenol for any ongoing discomfort, vitamin C, D and plenty of fluids.  Patient is aware of the plan of care and agreeable at this time, advised they follow-up if they continue to get worse or do not improve.    Negative for strep, notified via MyChart.      Supportive care, differential diagnoses, and indications for immediate follow-up discussed with patient.   Pathogenesis of diagnosis discussed including typical length and natural progression.   Instructed to return to clinic or nearest emergency department for any change in condition, further concerns, or worsening of symptoms.  Patient states understanding of the plan of care and discharge instructions.  Instructed to make an appointment, for follow up, with primary care provider.      Please note that this dictation was created using voice recognition software. I have made every reasonable attempt to correct obvious errors, but I expect that there are errors of grammar and possibly content that I did not discover before finalizing the note.      Meaghan CHAU

## 2024-02-14 ENCOUNTER — OFFICE VISIT (OUTPATIENT)
Dept: URGENT CARE | Facility: CLINIC | Age: 29
End: 2024-02-14
Payer: COMMERCIAL

## 2024-02-14 VITALS
BODY MASS INDEX: 24.33 KG/M2 | TEMPERATURE: 97.5 F | HEIGHT: 67 IN | OXYGEN SATURATION: 98 % | WEIGHT: 155 LBS | SYSTOLIC BLOOD PRESSURE: 112 MMHG | RESPIRATION RATE: 20 BRPM | DIASTOLIC BLOOD PRESSURE: 62 MMHG | HEART RATE: 72 BPM

## 2024-02-14 DIAGNOSIS — J20.9 ACUTE BRONCHITIS, UNSPECIFIED ORGANISM: ICD-10-CM

## 2024-02-14 PROCEDURE — 99213 OFFICE O/P EST LOW 20 MIN: CPT | Performed by: STUDENT IN AN ORGANIZED HEALTH CARE EDUCATION/TRAINING PROGRAM

## 2024-02-14 PROCEDURE — 3074F SYST BP LT 130 MM HG: CPT | Performed by: STUDENT IN AN ORGANIZED HEALTH CARE EDUCATION/TRAINING PROGRAM

## 2024-02-14 PROCEDURE — 3078F DIAST BP <80 MM HG: CPT | Performed by: STUDENT IN AN ORGANIZED HEALTH CARE EDUCATION/TRAINING PROGRAM

## 2024-02-14 RX ORDER — PREDNISONE 20 MG/1
20 TABLET ORAL DAILY
Qty: 5 TABLET | Refills: 0 | Status: SHIPPED | OUTPATIENT
Start: 2024-02-14 | End: 2024-02-19

## 2024-02-14 RX ORDER — DEXTROMETHORPHAN HYDROBROMIDE AND PROMETHAZINE HYDROCHLORIDE 15; 6.25 MG/5ML; MG/5ML
5 SYRUP ORAL EVERY 4 HOURS PRN
Qty: 120 ML | Refills: 0 | Status: SHIPPED | OUTPATIENT
Start: 2024-02-14

## 2024-02-15 NOTE — PROGRESS NOTES
"Subjective:   Farzana Henson is a 28 y.o. female who presents for Sore Throat (With cough since Jan./Had a URI with sinus congestion in Jan: All the symptoms are better except for her cough and sore-throat. /Did not get seen, did home covid tests but all were neg. In Jan. )      HPI:  28-year-old female presents to the urgent care for approximately 3 weeks of now dry cough.  States that this for started out with other symptoms consistent with a viral cold.  Initially had nasal congestion and runny nose as well but all her other symptoms have gone away.  Has still been having the cough.  She was taking DayQuil which did seem to improve the cough but it has not resolved entirely.  Cough is predominantly dry but slightly productive more so at night.  No fever, chills, nausea, vomiting, wheezing, shortness of breath, hemoptysis, or chest pain.    Medications:    hydrocortisone Oint  levonorgestrel-ethinyl estradiol  predniSONE Tabs  promethazine-dextromethorphan    Allergies: Patient has no known allergies.    Problem List: Farzana Henson does not have any pertinent problems on file.    Surgical History:  Past Surgical History:   Procedure Laterality Date    NASAL FRACTURE REDUCTION CLOSED         Past Social Hx: Farzana Henson  reports that she has never smoked. She has never used smokeless tobacco. She reports current alcohol use. She reports that she does not use drugs.     Past Family Hx:  Farzana Henson family history includes Cancer in her maternal grandmother, paternal aunt, and paternal grandmother; Heart Disease in her father; Lung Disease in her father; No Known Problems in her mother.     Problem list, medications, and allergies reviewed by myself today in Epic.     Objective:     /62 (BP Location: Left arm, Patient Position: Sitting, BP Cuff Size: Adult)   Pulse 72   Temp 36.4 °C (97.5 °F) (Temporal)   Resp 20   Ht 1.702 m (5' 7\")   Wt 70.3 kg (155 lb)   LMP 01/18/2024 "   SpO2 98%   BMI 24.28 kg/m²     Physical Exam  Vitals reviewed.   Constitutional:       Appearance: Normal appearance.   HENT:      Right Ear: Tympanic membrane, ear canal and external ear normal.      Left Ear: Tympanic membrane, ear canal and external ear normal.      Nose: Nose normal. No congestion or rhinorrhea.      Mouth/Throat:      Mouth: Mucous membranes are moist.      Pharynx: No oropharyngeal exudate or posterior oropharyngeal erythema.   Cardiovascular:      Rate and Rhythm: Normal rate and regular rhythm.      Pulses: Normal pulses.      Heart sounds: Normal heart sounds. No murmur heard.  Pulmonary:      Effort: Pulmonary effort is normal. No respiratory distress.      Breath sounds: Normal breath sounds. No stridor. No wheezing, rhonchi or rales.   Neurological:      Mental Status: She is alert.         Assessment/Plan:     Diagnosis and associated orders:     1. Acute bronchitis, unspecified organism  promethazine-dextromethorphan (PROMETHAZINE-DM) 6.25-15 MG/5ML syrup    predniSONE (DELTASONE) 20 MG Tab         Comments/MDM:     Patient's presentation physical exam findings are consistent with acute bronchitis secondary to recent viral infection.  Discussed typical timeframe for resolution of symptoms.  Promethazine-DM to better control cough.  We will utilize prednisone to hopefully reduce inflammation and resolve her current symptoms.  Pulmonary exam shows no wheezing, rales, rhonchi.  No signs of pneumonia.  No findings consistent with bacterial process or need for antibiotics.  No chest pain or shortness of breath.  Return precautions given.  Patient is agreeable to the plan.         Differential diagnosis, natural history, supportive care, and indications for immediate follow-up discussed.    Advised the patient to follow-up with the primary care physician for recheck, reevaluation, and consideration of further management.    Please note that this dictation was created using voice  recognition software. I have made a reasonable attempt to correct obvious errors, but I expect that there are errors of grammar and possibly content that I did not discover before finalizing the note.    Electronically signed by Alex Serrano PA-C.

## 2024-04-30 DIAGNOSIS — Z30.41 ENCOUNTER FOR SURVEILLANCE OF CONTRACEPTIVE PILLS: ICD-10-CM

## 2024-04-30 RX ORDER — LEVONORGESTREL AND ETHINYL ESTRADIOL 0.1-0.02MG
1 KIT ORAL DAILY
Qty: 84 TABLET | Refills: 0 | Status: SHIPPED | OUTPATIENT
Start: 2024-04-30

## 2024-04-30 NOTE — TELEPHONE ENCOUNTER
Received request via: Pharmacy    Was the patient seen in the last year in this department? Yes    Does the patient have an active prescription (recently filled or refills available) for medication(s) requested? No    [facility]    Does the patient have MCFP Plus and need 100 day supply (blood pressure, diabetes and cholesterol meds only)? Patient does not have SCP

## 2024-05-06 DIAGNOSIS — Z30.41 ENCOUNTER FOR SURVEILLANCE OF CONTRACEPTIVE PILLS: ICD-10-CM

## 2024-05-06 RX ORDER — LEVONORGESTREL/ETHIN.ESTRADIOL 0.1-0.02MG
1 TABLET ORAL DAILY
Qty: 84 TABLET | Refills: 0 | Status: SHIPPED | OUTPATIENT
Start: 2024-05-06 | End: 2024-05-09 | Stop reason: SDUPTHER

## 2024-05-06 NOTE — TELEPHONE ENCOUNTER
Received request via: Patient    Was the patient seen in the last year in this department? Yes    Does the patient have an active prescription (recently filled or refills available) for medication(s) requested? No    [facility]    Does the patient have USP Plus and need 100 day supply (blood pressure, diabetes and cholesterol meds only)? Medication is not for cholesterol, blood pressure or diabetes

## 2024-05-08 ENCOUNTER — TELEPHONE (OUTPATIENT)
Dept: MEDICAL GROUP | Facility: PHYSICIAN GROUP | Age: 29
End: 2024-05-08
Payer: COMMERCIAL

## 2024-05-08 NOTE — TELEPHONE ENCOUNTER
Returned patients call and let her know it was best to keep that appointment as getting a future appointment she may be out of medication.

## 2024-05-09 ENCOUNTER — OFFICE VISIT (OUTPATIENT)
Dept: MEDICAL GROUP | Facility: PHYSICIAN GROUP | Age: 29
End: 2024-05-09
Payer: COMMERCIAL

## 2024-05-09 VITALS
SYSTOLIC BLOOD PRESSURE: 90 MMHG | HEIGHT: 67 IN | OXYGEN SATURATION: 99 % | BODY MASS INDEX: 23.89 KG/M2 | WEIGHT: 152.2 LBS | TEMPERATURE: 97.3 F | DIASTOLIC BLOOD PRESSURE: 60 MMHG | HEART RATE: 68 BPM

## 2024-05-09 DIAGNOSIS — Z30.41 ENCOUNTER FOR SURVEILLANCE OF CONTRACEPTIVE PILLS: ICD-10-CM

## 2024-05-09 DIAGNOSIS — Z00.00 WELLNESS EXAMINATION: ICD-10-CM

## 2024-05-09 PROCEDURE — 99395 PREV VISIT EST AGE 18-39: CPT

## 2024-05-09 PROCEDURE — 3078F DIAST BP <80 MM HG: CPT

## 2024-05-09 PROCEDURE — 3074F SYST BP LT 130 MM HG: CPT

## 2024-05-09 RX ORDER — LEVONORGESTREL/ETHIN.ESTRADIOL 0.1-0.02MG
1 TABLET ORAL DAILY
Qty: 84 TABLET | Refills: 3 | Status: SHIPPED | OUTPATIENT
Start: 2024-05-09

## 2024-05-09 ASSESSMENT — PATIENT HEALTH QUESTIONNAIRE - PHQ9
CLINICAL INTERPRETATION OF PHQ2 SCORE: 2
SUM OF ALL RESPONSES TO PHQ QUESTIONS 1-9: 7
5. POOR APPETITE OR OVEREATING: 0 - NOT AT ALL

## 2024-05-09 NOTE — PROGRESS NOTES
Subjective:     CC:   Chief Complaint   Patient presents with    Follow-Up     birthcontrol       HPI:   Farzana Henson is a 28 y.o. female who presents for annual exam    Patient has GYN provider: No   Last Pap Smear: UTD   H/O Abnormal Pap: No  Last Mammogram: N/A  Last Bone Density Test: N/A  Last Colorectal Cancer Screening: N/A  Last Tdap: UTD  Received HPV series: Yes    Exercise: strenuous regular exercise, 5-7 days, walking and Crossfit  Diet: Healthish, could be better      No LMP recorded.  Hx STDs: No  Birth control: OCP  Menses every month with 5 days with moderate bleeding.  Reports mild cramping and does take OTC analgesics for cramps.  No significant bloating/fluid retention, pelvic pain, or dyspareunia. No abnormal vaginal discharge.  No breast tenderness, mass, nipple discharge, changes in size or contour, or abnormal cyclic discomfort.    OB History   No obstetric history on file.      She  reports being sexually active and has had partner(s) who are male. She reports using the following method of birth control/protection: Pill.    She  has a past medical history of Anxiety.  She  has a past surgical history that includes nasal fracture reduction closed.    Family History   Problem Relation Age of Onset    No Known Problems Mother     Heart Disease Father     Lung Disease Father     Cancer Paternal Aunt     Cancer Maternal Grandmother     Cancer Paternal Grandmother      Social History     Tobacco Use    Smoking status: Never    Smokeless tobacco: Never   Vaping Use    Vaping Use: Never used   Substance Use Topics    Alcohol use: Yes     Comment: socially    Drug use: Never       Patient Active Problem List    Diagnosis Date Noted    Chapped lips 05/31/2023    ADD (attention deficit disorder) 12/01/2022    Generalized anxiety disorder 04/25/2022     Current Outpatient Medications   Medication Sig Dispense Refill    levonorgestrel-ethinyl estradiol (FALMINA) 0.1-20 MG-MCG per tablet Take 1  "Tablet by mouth every day. 84 Tablet 3    hydrocortisone 2.5 % Ointment Apply 1 Application topically 2 times a day. 20 g 0     No current facility-administered medications for this visit.     No Known Allergies    Review of Systems   Constitutional: Negative for fever, chills and malaise/fatigue.   HENT: Negative for congestion.    Eyes: Negative for pain.   Respiratory: Negative for cough and shortness of breath.    Cardiovascular: Negative for chest pain and leg swelling.   Gastrointestinal: Negative for nausea, vomiting, abdominal pain and diarrhea.   Genitourinary: Negative for dysuria and hematuria.   Skin: Negative for rash.   Neurological: Negative for dizziness, focal weakness and headaches.   Endo/Heme/Allergies: Does not bruise/bleed easily.   Psychiatric/Behavioral: Negative for depression.  The patient is not nervous/anxious.      Objective:   BP 90/60 (BP Location: Left arm, Patient Position: Sitting, BP Cuff Size: Adult)   Pulse 68   Temp 36.3 °C (97.3 °F) (Temporal)   Ht 1.702 m (5' 7\")   Wt 69 kg (152 lb 3.2 oz)   SpO2 99%   BMI 23.84 kg/m²     Wt Readings from Last 4 Encounters:   05/09/24 69 kg (152 lb 3.2 oz)   02/14/24 70.3 kg (155 lb)   11/21/23 70.3 kg (155 lb)   05/31/23 71.5 kg (157 lb 9.6 oz)       Physical Exam:  Constitutional: Well-developed and well-nourished. Not diaphoretic. No distress.   Skin: Skin is warm and dry. No rash noted.  Head: Atraumatic without lesions.  Eyes: Conjunctivae and extraocular motions are normal. Pupils are equal, round, and reactive to light. No scleral icterus.   Ears:  External ears unremarkable. Tympanic membranes clear and intact.  Nose: Nares patent. Septum midline. Turbinates without erythema nor edema. No discharge.   Mouth/Throat: Tongue normal. Oropharynx is clear and moist. Posterior pharynx without erythema or exudates.  Neck: Supple, trachea midline. Normal range of motion. No thyromegaly present. No lymphadenopathy--cervical or " supraclavicular.  Cardiovascular: Regular rate and rhythm, S1 and S2 without murmur, rubs, or gallops.    Respiratory: Effort normal. Clear to auscultation throughout. No adventitious sounds.   Abdomen: Soft, non tender, and without distention. Active bowel sounds in all four quadrants. No rebound, guarding, masses or HSM.  Extremities: No cyanosis, clubbing, erythema, nor edema. Distal pulses intact and symmetric.   Musculoskeletal: All major joints AROM full in all directions without pain.  Neurological: Alert and oriented x 3. Grossly non-focal. Strength and sensation grossly intact. DTRs 2+/3 and symmetric.   Psychiatric:  Behavior, mood, and affect are appropriate.    Assessment and Plan:     1. Wellness examination    2. Encounter for surveillance of contraceptive pills  - levonorgestrel-ethinyl estradiol (FALMINA) 0.1-20 MG-MCG per tablet; Take 1 Tablet by mouth every day.  Dispense: 84 Tablet; Refill: 3      Health maintenance:  UTD   Labs per orders  Immunizations per orders  Patient counseled about skin care, diet, supplements, and exercise.  Discussed  breast self exam, mammography screening, STD prevention, use and side effects of OCP's, adequate intake of calcium and vitamin D, diet and exercise

## 2024-12-02 ENCOUNTER — OFFICE VISIT (OUTPATIENT)
Dept: URGENT CARE | Facility: CLINIC | Age: 29
End: 2024-12-02
Payer: COMMERCIAL

## 2024-12-02 VITALS
DIASTOLIC BLOOD PRESSURE: 66 MMHG | TEMPERATURE: 98.5 F | OXYGEN SATURATION: 96 % | RESPIRATION RATE: 16 BRPM | BODY MASS INDEX: 23.3 KG/M2 | HEIGHT: 66 IN | WEIGHT: 145 LBS | SYSTOLIC BLOOD PRESSURE: 122 MMHG | HEART RATE: 112 BPM

## 2024-12-02 DIAGNOSIS — R05.1 ACUTE COUGH: ICD-10-CM

## 2024-12-02 LAB
FLUAV RNA SPEC QL NAA+PROBE: NEGATIVE
FLUBV RNA SPEC QL NAA+PROBE: NEGATIVE
RSV RNA SPEC QL NAA+PROBE: NEGATIVE
S PYO DNA SPEC NAA+PROBE: NOT DETECTED
SARS-COV-2 RNA RESP QL NAA+PROBE: NEGATIVE

## 2024-12-02 PROCEDURE — 87651 STREP A DNA AMP PROBE: CPT

## 2024-12-02 PROCEDURE — 3074F SYST BP LT 130 MM HG: CPT

## 2024-12-02 PROCEDURE — 3078F DIAST BP <80 MM HG: CPT

## 2024-12-02 PROCEDURE — 99213 OFFICE O/P EST LOW 20 MIN: CPT

## 2024-12-02 PROCEDURE — 0241U POCT CEPHEID COV-2, FLU A/B, RSV - PCR: CPT

## 2024-12-02 RX ORDER — DEXTROMETHORPHAN HYDROBROMIDE AND PROMETHAZINE HYDROCHLORIDE 15; 6.25 MG/5ML; MG/5ML
5 SYRUP ORAL NIGHTLY PRN
Qty: 118 ML | Refills: 0 | Status: SHIPPED | OUTPATIENT
Start: 2024-12-02 | End: 2024-12-18

## 2024-12-02 RX ORDER — PREDNISONE 20 MG/1
40 TABLET ORAL DAILY
Qty: 10 TABLET | Refills: 0 | Status: SHIPPED | OUTPATIENT
Start: 2024-12-02 | End: 2024-12-07

## 2024-12-02 ASSESSMENT — ENCOUNTER SYMPTOMS
NAUSEA: 0
DIARRHEA: 0
WHEEZING: 0
VOMITING: 0
ABDOMINAL PAIN: 0
FEVER: 0
MYALGIAS: 0
SHORTNESS OF BREATH: 0
COUGH: 1
SORE THROAT: 1
CHILLS: 0
HEADACHES: 0

## 2024-12-02 NOTE — PROGRESS NOTES
"Subjective:   Farzana Henson is a 28 y.o. female who presents for Sore Throat (Cough, congestion x 5 days)      Cough  This is a new problem. The current episode started in the past 7 days. The problem has been gradually worsening. Associated symptoms include nasal congestion, postnasal drip and a sore throat. Pertinent negatives include no chest pain, chills, ear pain, fever, headaches, myalgias, rash, shortness of breath or wheezing. The symptoms are aggravated by lying down. Risk factors for lung disease include travel (Traveled from [place]). Her past medical history is significant for bronchitis. There is no history of asthma or pneumonia.       Review of Systems   Constitutional:  Negative for chills, fever and malaise/fatigue.   HENT:  Positive for congestion, postnasal drip and sore throat. Negative for ear pain and hearing loss.    Respiratory:  Positive for cough. Negative for shortness of breath and wheezing.    Cardiovascular:  Negative for chest pain.   Gastrointestinal:  Negative for abdominal pain, diarrhea, nausea and vomiting.   Genitourinary:  Negative for dysuria.   Musculoskeletal:  Negative for myalgias.   Skin:  Negative for rash.   Neurological:  Negative for headaches.       Medications, Allergies, and current problem list reviewed today in Epic.     Objective:     /66 (BP Location: Left arm, Patient Position: Sitting, BP Cuff Size: Adult)   Pulse (!) 112   Temp 36.9 °C (98.5 °F) (Temporal)   Resp 16   Ht 1.676 m (5' 6\")   Wt 65.8 kg (145 lb)   SpO2 96%     Physical Exam  Vitals and nursing note reviewed.   Constitutional:       General: She is not in acute distress.     Appearance: Normal appearance. She is not ill-appearing.   HENT:      Head: Normocephalic and atraumatic.      Right Ear: Tympanic membrane normal.      Left Ear: Tympanic membrane normal.      Nose: Congestion present.      Mouth/Throat:      Mouth: Mucous membranes are moist.      Pharynx: Uvula midline. " Postnasal drip present. No pharyngeal swelling, oropharyngeal exudate, posterior oropharyngeal erythema or uvula swelling.      Tonsils: No tonsillar exudate or tonsillar abscesses.   Eyes:      Conjunctiva/sclera: Conjunctivae normal.   Cardiovascular:      Rate and Rhythm: Normal rate.      Heart sounds: Normal heart sounds.   Pulmonary:      Effort: Pulmonary effort is normal. No respiratory distress.      Breath sounds: No stridor. No wheezing or rhonchi.   Abdominal:      General: Abdomen is flat.      Palpations: Abdomen is soft.   Musculoskeletal:         General: Normal range of motion.      Cervical back: Normal range of motion.   Skin:     General: Skin is warm and dry.      Capillary Refill: Capillary refill takes less than 2 seconds.   Neurological:      Mental Status: She is alert and oriented to person, place, and time.   Psychiatric:         Mood and Affect: Mood normal.         Behavior: Behavior normal.         Assessment/Plan:       1. Acute cough  POCT CEPHEID GROUP A STREP - PCR    POCT CoV-2, Flu A/B, RSV by PCR    predniSONE (DELTASONE) 20 MG Tab    promethazine-dextromethorphan (PROMETHAZINE-DM) 6.25-15 MG/5ML syrup        After assessment patient here with complaints of congestion and persisting cough for the past 5 days after recently traveling home from [place] when she went to [place].  Patient has been using over-the-counter treatments with no relief.  Patient does have history of bronchitis in the past.  Patient does also report a associated sore throat and does report that she has had strep in the past.  Viral and strep swab were performed in office and patient was negative at this time.  Upon assessment patient showed no signs of respiratory distress and lung sounds were clear via auscultation.  Patient did have noted postnasal drip and congestion.  At this time patient symptoms do appear viral in nature.  I did prescribe patient prednisone and promethazine cough syrup.  Patient  instructed take these as prescribed.  Recommend adequate hydration, rest, deep breathing and coughing, ambulation as tolerated, OTC medications.  Patient instructed to monitor for any worsening signs and symptoms of any other concerns patient was instructed to return to urgent care for reevaluation.    Differential diagnosis, natural history, and supportive care discussed. We also reviewed side effects of medication including allergic response, GI upset, tendon injury, rash, sedation etc. Patient and/or guardian voices understanding.      Advised the patient to follow-up with the primary care physician for recheck, reevaluation, and consideration of further management.    I personally reviewed prior external notes and test results pertinent to today's visit as well as additional imaging and testing completed in clinic today.     Please note that this dictation was created using voice recognition software. I have made every reasonable attempt to correct obvious errors, but I expect that there are errors of grammar and possibly content that I did not discover before finalizing the note.    This note was electronically signed by GRACE Goddard

## 2024-12-18 ENCOUNTER — APPOINTMENT (OUTPATIENT)
Dept: RADIOLOGY | Facility: IMAGING CENTER | Age: 29
End: 2024-12-18
Attending: FAMILY MEDICINE
Payer: COMMERCIAL

## 2024-12-18 ENCOUNTER — OFFICE VISIT (OUTPATIENT)
Dept: URGENT CARE | Facility: CLINIC | Age: 29
End: 2024-12-18
Payer: COMMERCIAL

## 2024-12-18 VITALS
HEART RATE: 120 BPM | OXYGEN SATURATION: 95 % | RESPIRATION RATE: 22 BRPM | WEIGHT: 142.8 LBS | TEMPERATURE: 98.1 F | BODY MASS INDEX: 22.95 KG/M2 | HEIGHT: 66 IN | DIASTOLIC BLOOD PRESSURE: 74 MMHG | SYSTOLIC BLOOD PRESSURE: 128 MMHG

## 2024-12-18 DIAGNOSIS — R07.81 PLEURITIC CHEST PAIN: ICD-10-CM

## 2024-12-18 DIAGNOSIS — R05.2 SUBACUTE COUGH: ICD-10-CM

## 2024-12-18 PROCEDURE — 71046 X-RAY EXAM CHEST 2 VIEWS: CPT | Mod: TC | Performed by: RADIOLOGY

## 2024-12-18 PROCEDURE — 99214 OFFICE O/P EST MOD 30 MIN: CPT | Mod: 25 | Performed by: FAMILY MEDICINE

## 2024-12-18 PROCEDURE — 96372 THER/PROPH/DIAG INJ SC/IM: CPT | Performed by: FAMILY MEDICINE

## 2024-12-18 RX ORDER — DEXTROMETHORPHAN HYDROBROMIDE AND PROMETHAZINE HYDROCHLORIDE 15; 6.25 MG/5ML; MG/5ML
5 SYRUP ORAL 4 TIMES DAILY PRN
Qty: 120 ML | Refills: 0 | Status: SHIPPED | OUTPATIENT
Start: 2024-12-18

## 2024-12-18 RX ORDER — METHYLPREDNISOLONE 4 MG/1
TABLET ORAL
Qty: 21 TABLET | Refills: 0 | Status: SHIPPED | OUTPATIENT
Start: 2024-12-18

## 2024-12-18 RX ORDER — KETOROLAC TROMETHAMINE 15 MG/ML
15 INJECTION, SOLUTION INTRAMUSCULAR; INTRAVENOUS ONCE
Status: COMPLETED | OUTPATIENT
Start: 2024-12-18 | End: 2024-12-18

## 2024-12-18 RX ADMIN — KETOROLAC TROMETHAMINE 15 MG: 15 INJECTION, SOLUTION INTRAMUSCULAR; INTRAVENOUS at 18:26

## 2024-12-19 ENCOUNTER — APPOINTMENT (OUTPATIENT)
Dept: MEDICAL GROUP | Facility: PHYSICIAN GROUP | Age: 29
End: 2024-12-19
Payer: COMMERCIAL

## 2024-12-19 NOTE — PROGRESS NOTES
"Subjective     Farzana Henson is a 28 y.o. female who presents with Cough (Sharp pain in ribs on the (L) side when breathing & coughing x 3 weeks )            3 weeks productive cough no blood in sputum.  Cough is an ongoing problem initially evaluated 12/2/24 here.  Respiratory viral panel negative at that time.  Severe left anterior chest pain with deep inspiration and movement.  No shortness of breath or wheeze.  No limb swelling.  No fever.  No other aggravating alleviating factors.        Review of Systems   Constitutional:  Negative for malaise/fatigue and weight loss.   Eyes:  Negative for discharge and redness.   Gastrointestinal:  Negative for nausea and vomiting.   Musculoskeletal:  Negative for joint pain and myalgias.   Skin:  Negative for itching and rash.              Objective     /74 (BP Location: Left arm, Patient Position: Sitting, BP Cuff Size: Large adult)   Pulse (!) 120   Temp 36.7 °C (98.1 °F) (Temporal)   Resp (!) 22   Ht 1.676 m (5' 6\")   Wt 64.8 kg (142 lb 12.8 oz)   SpO2 95%   BMI 23.05 kg/m²      Physical Exam  Constitutional:       General: She is not in acute distress.     Appearance: She is well-developed.   HENT:      Head: Normocephalic and atraumatic.   Eyes:      Conjunctiva/sclera: Conjunctivae normal.   Cardiovascular:      Rate and Rhythm: Normal rate and regular rhythm.      Heart sounds: Normal heart sounds. No murmur heard.  Pulmonary:      Effort: Pulmonary effort is normal.      Breath sounds: Normal breath sounds. No wheezing.   Chest:      Chest wall: Tenderness (Left mid chest anterior axillary line.  No deformity.  No crepitus.  No subcutaneous air.) present.   Skin:     General: Skin is warm and dry.      Findings: No rash.   Neurological:      Mental Status: She is alert.                             Assessment & Plan      CXR: no acute cardiopulmonary process per radiology    Urgent care visit 12/2/24 reviewed    1. Subacute cough  DX-CHEST-2 VIEWS "    promethazine-dextromethorphan (PROMETHAZINE-DM) 6.25-15 MG/5ML syrup      2. Pleuritic chest pain  DX-CHEST-2 VIEWS    ketorolac (Toradol) 15 MG/ML injection 15 mg    methylPREDNISolone (MEDROL DOSEPAK) 4 MG Tablet Therapy Pack        Differential diagnosis, natural history, supportive care, and indications for immediate follow-up were discussed.     Suspect musculoskeletal etiology.  DDx includes pleurisy.

## 2024-12-22 ASSESSMENT — ENCOUNTER SYMPTOMS
MYALGIAS: 0
EYE REDNESS: 0
VOMITING: 0
EYE DISCHARGE: 0
NAUSEA: 0
WEIGHT LOSS: 0

## 2025-03-14 ENCOUNTER — OFFICE VISIT (OUTPATIENT)
Dept: URGENT CARE | Facility: CLINIC | Age: 30
End: 2025-03-14
Payer: COMMERCIAL

## 2025-03-14 ENCOUNTER — HOSPITAL ENCOUNTER (OUTPATIENT)
Facility: MEDICAL CENTER | Age: 30
End: 2025-03-14
Payer: COMMERCIAL

## 2025-03-14 VITALS
BODY MASS INDEX: 24.66 KG/M2 | SYSTOLIC BLOOD PRESSURE: 110 MMHG | HEIGHT: 65 IN | RESPIRATION RATE: 16 BRPM | HEART RATE: 71 BPM | DIASTOLIC BLOOD PRESSURE: 68 MMHG | TEMPERATURE: 98.1 F | OXYGEN SATURATION: 96 % | WEIGHT: 148 LBS

## 2025-03-14 DIAGNOSIS — J02.9 SORE THROAT: ICD-10-CM

## 2025-03-14 LAB — S PYO DNA SPEC NAA+PROBE: NOT DETECTED

## 2025-03-14 PROCEDURE — 87651 STREP A DNA AMP PROBE: CPT

## 2025-03-14 PROCEDURE — 3078F DIAST BP <80 MM HG: CPT

## 2025-03-14 PROCEDURE — 3074F SYST BP LT 130 MM HG: CPT

## 2025-03-14 PROCEDURE — 99213 OFFICE O/P EST LOW 20 MIN: CPT

## 2025-03-14 PROCEDURE — 87070 CULTURE OTHR SPECIMN AEROBIC: CPT

## 2025-03-15 NOTE — PROGRESS NOTES
"Subjective:   Farzana Henson is a 29 y.o. female who presents for Sore Throat (Sore throat x 1 day)      Pharyngitis   This is a new problem. The current episode started today. The problem has been gradually worsening. Neither side of throat is experiencing more pain than the other. There has been no fever. Pertinent negatives include no abdominal pain, congestion, coughing, diarrhea, ear pain, headaches, shortness of breath or vomiting. She has had exposure to strep. She has tried nothing for the symptoms.       Review of Systems   Constitutional:  Negative for chills, fever and malaise/fatigue.   HENT:  Positive for sore throat. Negative for congestion, ear pain and hearing loss.    Respiratory:  Negative for cough and shortness of breath.    Cardiovascular:  Negative for chest pain.   Gastrointestinal:  Negative for abdominal pain, diarrhea, nausea and vomiting.   Genitourinary:  Negative for dysuria.   Musculoskeletal:  Negative for myalgias.   Skin:  Negative for rash.   Neurological:  Negative for headaches.       Medications, Allergies, and current problem list reviewed today in Epic.     Objective:     /68 (BP Location: Left arm, Patient Position: Sitting, BP Cuff Size: Adult)   Pulse 71   Temp 36.7 °C (98.1 °F) (Temporal)   Resp 16   Ht 1.638 m (5' 4.5\")   Wt 67.1 kg (148 lb)   SpO2 96%     Physical Exam  Vitals and nursing note reviewed.   Constitutional:       General: She is not in acute distress.     Appearance: Normal appearance. She is not ill-appearing.   HENT:      Head: Normocephalic and atraumatic.      Right Ear: Tympanic membrane normal.      Left Ear: Tympanic membrane normal.      Nose: Nose normal.      Mouth/Throat:      Mouth: Mucous membranes are moist.      Pharynx: Uvula midline. Posterior oropharyngeal erythema present. No pharyngeal swelling, oropharyngeal exudate, uvula swelling or postnasal drip.      Tonsils: No tonsillar exudate or tonsillar abscesses.   Eyes:    "   Conjunctiva/sclera: Conjunctivae normal.   Cardiovascular:      Rate and Rhythm: Normal rate.      Heart sounds: Normal heart sounds.   Pulmonary:      Effort: Pulmonary effort is normal.   Abdominal:      General: Abdomen is flat.      Palpations: Abdomen is soft.   Musculoskeletal:         General: Normal range of motion.      Cervical back: Normal range of motion.   Skin:     General: Skin is warm and dry.      Capillary Refill: Capillary refill takes less than 2 seconds.   Neurological:      Mental Status: She is alert and oriented to person, place, and time.   Psychiatric:         Mood and Affect: Mood normal.         Behavior: Behavior normal.       Results for orders placed or performed in visit on 03/14/25   POCT CEPHEID GROUP A STREP - PCR    Collection Time: 03/14/25  5:27 PM   Result Value Ref Range    POC Group A Strep, PCR Not Detected Not Detected, Invalid       Assessment/Plan:       1. Sore throat  POCT CEPHEID GROUP A STREP - PCR    CULTURE THROAT        After assessment patient here with complaints of sore throat after a recent exposure from coworker.  A rapid strep swab was performed and was negative at this time.  I did send swab for culture at this time.  Patient's symptoms overall do appear viral in nature and conservative measures were provided at this time.  Patient instructed to monitor for any worsening signs and symptoms of any other concerns she was instructed return to urgent care for reevaluation.    Increase fluids and rest  Advil or Tylenol for fever and pain  OTC meds and conservative measures including lozenges, sprays, etc.    Differential diagnosis, natural history, and supportive care discussed. We also reviewed side effects of medication including allergic response, GI upset, tendon injury, rash, sedation etc. Patient and/or guardian voices understanding.      Advised the patient to follow-up with the primary care physician for recheck, reevaluation, and consideration of  further management.    I personally reviewed prior external notes and test results pertinent to today's visit as well as additional imaging and testing completed in clinic today.     Please note that this dictation was created using voice recognition software. I have made every reasonable attempt to correct obvious errors, but I expect that there are errors of grammar and possibly content that I did not discover before finalizing the note.    This note was electronically signed by GRACE Goddard

## 2025-03-16 ASSESSMENT — ENCOUNTER SYMPTOMS
FEVER: 0
COUGH: 0
DIARRHEA: 0
ABDOMINAL PAIN: 0
CHILLS: 0
VOMITING: 0
NAUSEA: 0
HEADACHES: 0
SHORTNESS OF BREATH: 0
MYALGIAS: 0
SORE THROAT: 1

## 2025-03-17 ENCOUNTER — RESULTS FOLLOW-UP (OUTPATIENT)
Dept: URGENT CARE | Facility: PHYSICIAN GROUP | Age: 30
End: 2025-03-17

## 2025-03-17 LAB
BACTERIA SPEC RESP CULT: NORMAL
SIGNIFICANT IND 70042: NORMAL
SITE SITE: NORMAL
SOURCE SOURCE: NORMAL

## 2025-06-09 DIAGNOSIS — Z30.41 ENCOUNTER FOR SURVEILLANCE OF CONTRACEPTIVE PILLS: ICD-10-CM

## 2025-06-09 RX ORDER — LEVONORGESTREL/ETHIN.ESTRADIOL 0.1-0.02MG
1 TABLET ORAL DAILY
Qty: 28 TABLET | Refills: 0 | Status: SHIPPED | OUTPATIENT
Start: 2025-06-09 | End: 2025-06-24 | Stop reason: SDUPTHER

## 2025-06-09 NOTE — TELEPHONE ENCOUNTER
Received request via: Patient    Was the patient seen in the last year in this department? No    Does the patient have an active prescription (recently filled or refills available) for medication(s) requested? No    [facility]     Does the patient have [facility] and need 100-day supply? (This applies to ALL medications) Patient does not have SCP

## 2025-06-24 ENCOUNTER — OFFICE VISIT (OUTPATIENT)
Dept: MEDICAL GROUP | Facility: PHYSICIAN GROUP | Age: 30
End: 2025-06-24
Payer: COMMERCIAL

## 2025-06-24 VITALS
SYSTOLIC BLOOD PRESSURE: 90 MMHG | BODY MASS INDEX: 25.56 KG/M2 | HEIGHT: 65 IN | WEIGHT: 153.4 LBS | DIASTOLIC BLOOD PRESSURE: 60 MMHG | OXYGEN SATURATION: 95 % | TEMPERATURE: 98.2 F | HEART RATE: 67 BPM

## 2025-06-24 DIAGNOSIS — Z00.00 HEALTHCARE MAINTENANCE: Primary | ICD-10-CM

## 2025-06-24 DIAGNOSIS — Z30.41 ENCOUNTER FOR SURVEILLANCE OF CONTRACEPTIVE PILLS: ICD-10-CM

## 2025-06-24 DIAGNOSIS — Z13.6 SCREENING FOR CARDIOVASCULAR CONDITION: ICD-10-CM

## 2025-06-24 DIAGNOSIS — Z13.29 SCREENING FOR THYROID DISORDER: ICD-10-CM

## 2025-06-24 PROCEDURE — 3074F SYST BP LT 130 MM HG: CPT

## 2025-06-24 PROCEDURE — 99213 OFFICE O/P EST LOW 20 MIN: CPT

## 2025-06-24 PROCEDURE — 3078F DIAST BP <80 MM HG: CPT

## 2025-06-24 RX ORDER — LEVONORGESTREL/ETHIN.ESTRADIOL 0.1-0.02MG
1 TABLET ORAL DAILY
Qty: 84 TABLET | Refills: 3 | Status: SHIPPED | OUTPATIENT
Start: 2025-06-24

## 2025-06-24 ASSESSMENT — ENCOUNTER SYMPTOMS
VOMITING: 0
MYALGIAS: 0
DIZZINESS: 0
SHORTNESS OF BREATH: 0
CONSTIPATION: 0
DIARRHEA: 0
NAUSEA: 0
WEIGHT LOSS: 0
ABDOMINAL PAIN: 0
CHILLS: 0
COUGH: 0
FEVER: 0
WEAKNESS: 0
HEADACHES: 0
BLURRED VISION: 0

## 2025-06-25 NOTE — PROGRESS NOTES
Verbal consent was acquired by the patient to use [employer] ambient listening note generation during this visit  Subjective:     CC:  The primary encounter diagnosis was Healthcare maintenance. Diagnoses of Screening for cardiovascular condition, Screening for thyroid disorder, and Encounter for surveillance of contraceptive pills were also pertinent to this visit.    HISTORY OF THE PRESENT ILLNESS: Patient is a 29 y.o. female.   No problems updated.    History of Present Illness  The patient, a 29-year-old female, presents for a contraceptive prescription refill.    Contraceptive Prescription Refill  - She has recently had her contraceptive prescription refilled.    Annual Wellness Examination  - She has an annual wellness examination scheduled for September 2025.  - She seeks guidance on any necessary preparations for this upcoming visit.    Laboratory Tests  - The patient has expressed interest in undergoing laboratory tests to establish a baseline of her health status.    Immune System Efficiency  - Over the past few years, she has observed a decline in her immune system's efficiency, resulting in increased susceptibility to infections.  - Her occupational environment involves close contact with numerous individuals.    Sexually Transmitted Infections Screening  - She has opted against screening for sexually transmitted infections.    SOCIAL HISTORY  She works in an office.    ROS:   Review of Systems   Constitutional:  Negative for chills, fever, malaise/fatigue and weight loss.   Eyes:  Negative for blurred vision.   Respiratory:  Negative for cough and shortness of breath.    Cardiovascular:  Negative for chest pain.   Gastrointestinal:  Negative for abdominal pain, constipation, diarrhea, nausea and vomiting.   Musculoskeletal:  Negative for myalgias.   Neurological:  Negative for dizziness, weakness and headaches.         Objective:     Exam: BP 90/60 (BP Location: Left arm, Patient Position: Sitting, BP  "Cuff Size: Adult)   Pulse 67   Temp 36.8 °C (98.2 °F) (Temporal)   Ht 1.638 m (5' 4.5\")   Wt 69.6 kg (153 lb 6.4 oz)   SpO2 95%  Body mass index is 25.92 kg/m².    Physical Exam  Constitutional:       General: She is not in acute distress.     Appearance: Normal appearance. She is not ill-appearing or toxic-appearing.   HENT:      Head: Normocephalic.   Eyes:      Conjunctiva/sclera: Conjunctivae normal.   Pulmonary:      Effort: Pulmonary effort is normal.   Skin:     General: Skin is warm and dry.   Neurological:      General: No focal deficit present.      Mental Status: She is alert and oriented to person, place, and time.   Psychiatric:         Mood and Affect: Mood normal.         Behavior: Behavior normal.           Labs:     Assessment & Plan: Medical Decision Making   29 y.o. female with the following -    Assessment & Plan  Oral contraceptive management  - Currently on birth control pills and otherwise healthy. Continue levonorgestrel-ethinyl estradiol 0.1-20 mg-Mcg daily  - Comprehensive panel of laboratory tests ordered to assess overall health, including screening for cardiovascular and thyroid diseases.  - Tests can be conducted either in a fasting or non-fasting state, depending on preference.  - Results will be reviewed during the upcoming wellness examination, and a Pap smear will be performed at the next visit.    2. Medication management.  - Birth control prescription refilled for 84 tablets with 3 refills.  - Prescription sent to pharmacy for collection at convenience.      Problem List Items Addressed This Visit    None  Visit Diagnoses         Healthcare maintenance    -  Primary    Relevant Orders    CBC WITHOUT DIFFERENTIAL    Comp Metabolic Panel    Lipid Profile    TSH WITH REFLEX TO FT4    VITAMIN D,25 HYDROXY (DEFICIENCY)    HEMOGLOBIN A1C      Screening for cardiovascular condition        Relevant Orders    Lipid Profile      Screening for thyroid disorder        Relevant Orders    " TSH WITH REFLEX TO FT4      Encounter for surveillance of contraceptive pills        Relevant Medications    levonorgestrel-ethinyl estradiol (FALMINA) 0.1-20 MG-MCG per tablet            Differential diagnosis, natural history, supportive care, and indications for immediate follow-up discussed.  Shared decision making approach was utilized, and patient is amendable with plan of care.  Patient understands to return to clinic or go to the emergency department if symptoms worsen. All questions and concerns addressed to the best of my knowledge.      Return in about 3 months (around 9/24/2025), or if symptoms worsen or fail to improve.    Please note that this dictation was created using voice recognition software. I have made every reasonable attempt to correct obvious errors, but I expect that there are errors of grammar and possibly content that I did not discover before finalizing the note.

## 2025-08-16 ENCOUNTER — HOSPITAL ENCOUNTER (OUTPATIENT)
Dept: LAB | Facility: MEDICAL CENTER | Age: 30
End: 2025-08-16
Payer: COMMERCIAL

## 2025-08-16 DIAGNOSIS — Z13.29 SCREENING FOR THYROID DISORDER: ICD-10-CM

## 2025-08-16 DIAGNOSIS — Z13.6 SCREENING FOR CARDIOVASCULAR CONDITION: ICD-10-CM

## 2025-08-16 DIAGNOSIS — Z00.00 HEALTHCARE MAINTENANCE: ICD-10-CM

## 2025-08-16 LAB
25(OH)D3 SERPL-MCNC: 32 NG/ML (ref 30–100)
ALBUMIN SERPL BCP-MCNC: 4.1 G/DL (ref 3.2–4.9)
ALBUMIN/GLOB SERPL: 1.6 G/DL
ALP SERPL-CCNC: 62 U/L (ref 30–99)
ALT SERPL-CCNC: 15 U/L (ref 2–50)
ANION GAP SERPL CALC-SCNC: 11 MMOL/L (ref 7–16)
AST SERPL-CCNC: 17 U/L (ref 12–45)
BILIRUB SERPL-MCNC: 0.4 MG/DL (ref 0.1–1.5)
BUN SERPL-MCNC: 13 MG/DL (ref 8–22)
CALCIUM ALBUM COR SERPL-MCNC: 9.1 MG/DL (ref 8.5–10.5)
CALCIUM SERPL-MCNC: 9.2 MG/DL (ref 8.5–10.5)
CHLORIDE SERPL-SCNC: 104 MMOL/L (ref 96–112)
CHOLEST SERPL-MCNC: 129 MG/DL (ref 100–199)
CO2 SERPL-SCNC: 23 MMOL/L (ref 20–33)
CREAT SERPL-MCNC: 0.9 MG/DL (ref 0.5–1.4)
ERYTHROCYTE [DISTWIDTH] IN BLOOD BY AUTOMATED COUNT: 43.6 FL (ref 35.9–50)
EST. AVERAGE GLUCOSE BLD GHB EST-MCNC: 105 MG/DL
FASTING STATUS PATIENT QL REPORTED: NORMAL
GFR SERPLBLD CREATININE-BSD FMLA CKD-EPI: 88 ML/MIN/1.73 M 2
GLOBULIN SER CALC-MCNC: 2.5 G/DL (ref 1.9–3.5)
GLUCOSE SERPL-MCNC: 77 MG/DL (ref 65–99)
HBA1C MFR BLD: 5.3 % (ref 4–5.6)
HCT VFR BLD AUTO: 47.3 % (ref 37–47)
HDLC SERPL-MCNC: 43 MG/DL
HGB BLD-MCNC: 15.1 G/DL (ref 12–16)
LDLC SERPL CALC-MCNC: 65 MG/DL
MCH RBC QN AUTO: 29.2 PG (ref 27–33)
MCHC RBC AUTO-ENTMCNC: 31.9 G/DL (ref 32.2–35.5)
MCV RBC AUTO: 91.3 FL (ref 81.4–97.8)
PLATELET # BLD AUTO: 302 K/UL (ref 164–446)
PMV BLD AUTO: 10.8 FL (ref 9–12.9)
POTASSIUM SERPL-SCNC: 4.2 MMOL/L (ref 3.6–5.5)
PROT SERPL-MCNC: 6.6 G/DL (ref 6–8.2)
RBC # BLD AUTO: 5.18 M/UL (ref 4.2–5.4)
SODIUM SERPL-SCNC: 138 MMOL/L (ref 135–145)
TRIGL SERPL-MCNC: 107 MG/DL (ref 0–149)
TSH SERPL DL<=0.005 MIU/L-ACNC: 2.69 UIU/ML (ref 0.38–5.33)
WBC # BLD AUTO: 8.2 K/UL (ref 4.8–10.8)

## 2025-08-16 PROCEDURE — 84443 ASSAY THYROID STIM HORMONE: CPT

## 2025-08-16 PROCEDURE — 85027 COMPLETE CBC AUTOMATED: CPT

## 2025-08-16 PROCEDURE — 83036 HEMOGLOBIN GLYCOSYLATED A1C: CPT

## 2025-08-16 PROCEDURE — 80061 LIPID PANEL: CPT

## 2025-08-16 PROCEDURE — 36415 COLL VENOUS BLD VENIPUNCTURE: CPT

## 2025-08-16 PROCEDURE — 80053 COMPREHEN METABOLIC PANEL: CPT

## 2025-08-16 PROCEDURE — 82306 VITAMIN D 25 HYDROXY: CPT

## 2025-08-18 ENCOUNTER — RESULTS FOLLOW-UP (OUTPATIENT)
Dept: MEDICAL GROUP | Facility: PHYSICIAN GROUP | Age: 30
End: 2025-08-18
Payer: COMMERCIAL